# Patient Record
Sex: FEMALE | Race: BLACK OR AFRICAN AMERICAN | Employment: OTHER | URBAN - METROPOLITAN AREA
[De-identification: names, ages, dates, MRNs, and addresses within clinical notes are randomized per-mention and may not be internally consistent; named-entity substitution may affect disease eponyms.]

---

## 2017-12-09 PROBLEM — J96.01 ACUTE RESPIRATORY FAILURE WITH HYPOXIA (HCC): Status: ACTIVE | Noted: 2017-12-09

## 2017-12-09 PROBLEM — J96.00 ACUTE RESPIRATORY FAILURE (HCC): Status: ACTIVE | Noted: 2017-12-09

## 2017-12-10 PROBLEM — R07.9 CHEST PAIN: Status: ACTIVE | Noted: 2017-12-10

## 2017-12-10 PROBLEM — J44.1 COPD WITH ACUTE EXACERBATION (HCC): Status: ACTIVE | Noted: 2017-12-10

## 2019-04-12 ENCOUNTER — HOSPITAL ENCOUNTER (OUTPATIENT)
Dept: GENERAL RADIOLOGY | Age: 59
Discharge: HOME | End: 2019-04-14
Payer: MEDICARE

## 2019-04-12 DIAGNOSIS — Z12.31 VISIT FOR SCREENING MAMMOGRAM: ICD-10-CM

## 2019-04-12 DIAGNOSIS — Z13.820 SCREENING FOR OSTEOPOROSIS: ICD-10-CM

## 2019-04-12 PROCEDURE — 77063 BREAST TOMOSYNTHESIS BI: CPT

## 2019-04-12 PROCEDURE — 77080 DXA BONE DENSITY AXIAL: CPT

## 2022-05-03 ENCOUNTER — OFFICE VISIT (OUTPATIENT)
Dept: ORTHOPEDIC SURGERY | Age: 62
End: 2022-05-03
Payer: COMMERCIAL

## 2022-05-03 VITALS — BODY MASS INDEX: 32.32 KG/M2 | HEIGHT: 65 IN | WEIGHT: 194 LBS | TEMPERATURE: 98 F

## 2022-05-03 DIAGNOSIS — S82.832A OTHER CLOSED FRACTURE OF DISTAL END OF LEFT FIBULA, INITIAL ENCOUNTER: Primary | ICD-10-CM

## 2022-05-03 PROCEDURE — 99203 OFFICE O/P NEW LOW 30 MIN: CPT | Performed by: ORTHOPAEDIC SURGERY

## 2022-05-03 PROCEDURE — 27786 TREATMENT OF ANKLE FRACTURE: CPT | Performed by: ORTHOPAEDIC SURGERY

## 2022-05-03 RX ORDER — HYDROCODONE BITARTRATE AND ACETAMINOPHEN 5; 325 MG/1; MG/1
1 TABLET ORAL EVERY 6 HOURS PRN
Qty: 28 TABLET | Refills: 0 | Status: SHIPPED
Start: 2022-05-03 | End: 2022-05-11 | Stop reason: SDUPTHER

## 2022-05-03 NOTE — PROGRESS NOTES
Albert Henriquez is a 62y.o. year old female who presents today for evaluation of a left ankle injury which occurred on 4/30/22. The patient reports that this injury occurred when she slipped and fell. The patient denies any other injuries. Movement makes the pain worse, the splint and resting makes the pain better. The patient's past medical history, medications, and review of systems was reviewed. On Physical Exam, Albert Henriquez is well-developed, well-nourished, and oriented to person, place and time. her gait is intact. On evaluation of her left lower extremity, there is not obvious deformity. There is swelling and is ecchymosis. she is tender to palpation over the lateral distal fibula region, and otherwise nontender over the remainder of the extremity. Range of motion is decreased secondary to pain over the left ankl. The skin overlying the left ankle is intact without evidence of lesion, laceration or abrasion. Distal pulses are 2+ and symmetric bilaterally. Sensation is grossly intact to light touch and symmetric bilaterally. Xrays:  Distal fibula fracture    Impression:    Diagnosis Orders   1.  Other closed fracture of distal end of left fibula, initial encounter  MD CLOSED TX DIST FIBULA FX    HYDROcodone-acetaminophen (NORCO) 5-325 MG per tablet         Plan:   Conservative treatment  nwb  Boot  Fu in 1 week with xr

## 2022-05-09 DIAGNOSIS — S82.832A OTHER CLOSED FRACTURE OF DISTAL END OF LEFT FIBULA, INITIAL ENCOUNTER: Primary | ICD-10-CM

## 2022-05-11 ENCOUNTER — OFFICE VISIT (OUTPATIENT)
Dept: ORTHOPEDIC SURGERY | Age: 62
End: 2022-05-11
Payer: COMMERCIAL

## 2022-05-11 VITALS — HEIGHT: 65 IN | BODY MASS INDEX: 32.32 KG/M2 | TEMPERATURE: 97.6 F | WEIGHT: 194 LBS

## 2022-05-11 DIAGNOSIS — M17.12 PRIMARY OSTEOARTHRITIS OF LEFT KNEE: ICD-10-CM

## 2022-05-11 DIAGNOSIS — S82.832A OTHER CLOSED FRACTURE OF DISTAL END OF LEFT FIBULA, INITIAL ENCOUNTER: Primary | ICD-10-CM

## 2022-05-11 PROCEDURE — 20610 DRAIN/INJ JOINT/BURSA W/O US: CPT | Performed by: NURSE PRACTITIONER

## 2022-05-11 PROCEDURE — 99213 OFFICE O/P EST LOW 20 MIN: CPT | Performed by: NURSE PRACTITIONER

## 2022-05-11 RX ORDER — TRIAMCINOLONE ACETONIDE 40 MG/ML
40 INJECTION, SUSPENSION INTRA-ARTICULAR; INTRAMUSCULAR ONCE
Status: COMPLETED | OUTPATIENT
Start: 2022-05-11 | End: 2022-05-11

## 2022-05-11 RX ORDER — HYDROCODONE BITARTRATE AND ACETAMINOPHEN 5; 325 MG/1; MG/1
1 TABLET ORAL EVERY 6 HOURS PRN
Qty: 28 TABLET | Refills: 0 | Status: SHIPPED | OUTPATIENT
Start: 2022-05-11 | End: 2022-05-18

## 2022-05-11 RX ADMIN — TRIAMCINOLONE ACETONIDE 40 MG: 40 INJECTION, SUSPENSION INTRA-ARTICULAR; INTRAMUSCULAR at 15:21

## 2022-05-11 NOTE — PROGRESS NOTES
Henrry Momin is a 62y.o. year old female who presents today for evaluation of a left ankle injury which occurred on 4/30/22. The patient reports that this injury occurred when she slipped and fell. She has also been having left knee  pain since the injury as well. The patient's past medical history, medications, and review of systems was reviewed. Past Medical History:   Diagnosis Date    Apnea     Arthritis     Asthma     COPD (chronic obstructive pulmonary disease) ([facility] [address]     History of cardiovascular stress test 12/13/2017    lexiscan stress test    Hypertension     Pneumonia 3/29/2014     Past Surgical History:   Procedure Laterality Date    BUNIONECTOMY      both feet    HERNIA REPAIR      TUBAL LIGATION      URETER REVISION       Current Outpatient Medications   Medication Sig Dispense Refill    mometasone-formoterol (DULERA) 200-5 MCG/ACT inhaler Inhale 2 puffs into the lungs 2 times daily 1 Inhaler 0    lisinopril (PRINIVIL;ZESTRIL) 20 MG tablet Take 1 tablet by mouth daily 30 tablet 3    budesonide-formoterol (SYMBICORT) 160-4.5 MCG/ACT AERO Inhale 2 puffs into the lungs 2 times daily      albuterol (PROVENTIL) (2.5 MG/3ML) 0.083% nebulizer solution Take 2.5 mg by nebulization every 6 hours as needed for Wheezing      Multiple Vitamins-Minerals (THERAPEUTIC MULTIVITAMIN-MINERALS) tablet Take 1 tablet by mouth daily      Omega-3 Fatty Acids (FISH OIL) 500 MG CAPS Take 1 capsule by mouth daily      verapamil (VERELAN PM) 240 MG CR capsule Take 240 mg by mouth 2 times daily       amitriptyline (ELAVIL) 50 MG tablet Take 50 mg by mouth nightly as needed       potassium chloride SA (K-DUR;KLOR-CON M) 10 MEQ tablet Take 10 mEq by mouth 2 times daily.  furosemide (LASIX) 40 MG tablet Take 40 mg by mouth 2 times daily.  ibuprofen (ADVIL;MOTRIN) 600 MG tablet Take 600 mg by mouth every 4 hours as needed for Pain.       oxyCODONE-acetaminophen (PERCOCET)  MG per tablet Take 1 tablet by mouth every 4 hours as needed for Pain (for break through pain).  loratadine (CLARITIN) 10 MG tablet Take 10 mg by mouth daily.  montelukast (SINGULAIR) 10 MG tablet Take 10 mg by mouth nightly.  theophylline (FRANSISCA-24) 200 MG SR capsule Take 200 mg by mouth daily.  esomeprazole Magnesium (NEXIUM) 20 MG PACK Take 40 mg by mouth 2 times daily       senna (SENOKOT) 8.6 MG tablet Take 1 tablet by mouth 3 times daily as needed for Constipation.  DULoxetine (CYMBALTA) 60 MG capsule Take 60 mg by mouth 2 times daily. No current facility-administered medications for this visit. On Physical Exam, La Nena Padilla is well-developed, well-nourished, and oriented to person, place and time. her gait is intact. On evaluation of her left lower extremity, there is not obvious deformity. There is swelling and is ecchymosis. she is tender to palpation over the lateral distal fibula region, and otherwise nontender over the remainder of the extremity. Range of motion is decreased secondary to pain over the left ankl. The skin overlying the left ankle is intact without evidence of lesion, laceration or abrasion. Distal pulses are 2+ and symmetric bilaterally. Sensation is grossly intact to light touch and symmetric bilaterally. Hip exam:   Upon inspection, there is not deformity noted. Upon palpation there is not tenderness. ROM: is  full and symmetrical.   Strength: Hip Flexors 5/5; Hip Abductors 5/5; Hip Adduction 5/5. Knee exam:  Left knee exam shows;  range of motion of R. Knee is 0 to 120, and L. Knee is 0 to 90. The patient does have  pain on motion, effusion is mild, there is tenderness over the  medial, anterior region, there are not any masses, there is not ligamentous instability, there is not  deformity noted. Knee exam: left positive for moderate crepitations, some mild tenderness laxity is not noted with  stress.   There is not a popliteal cyst.    R. Knee:  Lachman's negative, Anterior Drawer negative, Posterior Drawer negative  Camilla's negative, Thallasy  negative,   PF grind test negative, Apprehension test negative, Patellar J sign  negative  L. Knee:  Lachman's negative, Anterior Drawer negative, Posterior Drawer negative  Camilla's negative, Thallasy  negative,   PF grind test positive, Apprehension test negative,  Patellar J sign  negative      Xrays:  Distal fibula fracture with stable alignment, moderate to severe medial joint narrowing with osteophytes and subchondral sclerosis     Impression:    Diagnosis Orders   1. Other closed fracture of distal end of left fibula, initial encounter  20610 - LA DRAIN/INJECT LARGE JOINT/BURSA   2. Primary osteoarthritis of left knee           Plan:   Conservative treatment  nwb  Boot  I had a lengthy discussion with the patient regarding their diagnosis. I explained treatment options including surgical vs non surgical treatment. I reviewed in detail the risks and benefits and outlined the procedure in detail with expected outcomes and possible complications. I also discussed non surgical treatment such as injections (CSI and visco supplementation), physical therapy, topical creams and NSAID's. They have elected for conservative management at this time. I will proceed with a cortisone injection in the Left knee. Verbal and written consent was obtained for the injections. The skin was prepped with alcohol. 1mL of Kenalog 40mg and 9mL of 0.25% Marcaine was  injected to Left knee. The injection was given through the lateral side of the knee. The patient tolerated the injection well.  I will see the patient back in 4 weeks  oarrs run  norco refilled    xr left ankle only in 4 weeks Patient

## 2022-06-08 DIAGNOSIS — S82.832A OTHER CLOSED FRACTURE OF DISTAL END OF LEFT FIBULA, INITIAL ENCOUNTER: Primary | ICD-10-CM

## 2022-06-09 ENCOUNTER — OFFICE VISIT (OUTPATIENT)
Dept: ORTHOPEDIC SURGERY | Age: 62
End: 2022-06-09

## 2022-06-09 VITALS — BODY MASS INDEX: 32.32 KG/M2 | TEMPERATURE: 98 F | WEIGHT: 194 LBS | HEIGHT: 65 IN

## 2022-06-09 DIAGNOSIS — S82.832A OTHER CLOSED FRACTURE OF DISTAL END OF LEFT FIBULA, INITIAL ENCOUNTER: Primary | ICD-10-CM

## 2022-06-09 PROCEDURE — 99024 POSTOP FOLLOW-UP VISIT: CPT | Performed by: NURSE PRACTITIONER

## 2022-06-09 NOTE — PROGRESS NOTES
Reinaldo Gonzalez is a 62y.o. year old female who presents today for evaluation of a left ankle injury which occurred on 4/30/22. The patient reports that this injury occurred when she slipped and fell. She has also been having left knee  pain since the injury as well. The patient's past medical history, medications, and review of systems was reviewed. Past Medical History:   Diagnosis Date    Apnea     Arthritis     Asthma     COPD (chronic obstructive pulmonary disease) ([facility] [address]     History of cardiovascular stress test 12/13/2017    lexiscan stress test    Hypertension     Pneumonia 3/29/2014     Past Surgical History:   Procedure Laterality Date    BUNIONECTOMY      both feet    HERNIA REPAIR      TUBAL LIGATION      URETER REVISION       Current Outpatient Medications   Medication Sig Dispense Refill    mometasone-formoterol (DULERA) 200-5 MCG/ACT inhaler Inhale 2 puffs into the lungs 2 times daily 1 Inhaler 0    lisinopril (PRINIVIL;ZESTRIL) 20 MG tablet Take 1 tablet by mouth daily 30 tablet 3    budesonide-formoterol (SYMBICORT) 160-4.5 MCG/ACT AERO Inhale 2 puffs into the lungs 2 times daily      albuterol (PROVENTIL) (2.5 MG/3ML) 0.083% nebulizer solution Take 2.5 mg by nebulization every 6 hours as needed for Wheezing      Multiple Vitamins-Minerals (THERAPEUTIC MULTIVITAMIN-MINERALS) tablet Take 1 tablet by mouth daily      Omega-3 Fatty Acids (FISH OIL) 500 MG CAPS Take 1 capsule by mouth daily      verapamil (VERELAN PM) 240 MG CR capsule Take 240 mg by mouth 2 times daily       amitriptyline (ELAVIL) 50 MG tablet Take 50 mg by mouth nightly as needed       potassium chloride SA (K-DUR;KLOR-CON M) 10 MEQ tablet Take 10 mEq by mouth 2 times daily.  furosemide (LASIX) 40 MG tablet Take 40 mg by mouth 2 times daily.  ibuprofen (ADVIL;MOTRIN) 600 MG tablet Take 600 mg by mouth every 4 hours as needed for Pain.       oxyCODONE-acetaminophen (PERCOCET)  MG per tablet Take 1 tablet by mouth every 4 hours as needed for Pain (for break through pain).  loratadine (CLARITIN) 10 MG tablet Take 10 mg by mouth daily.  montelukast (SINGULAIR) 10 MG tablet Take 10 mg by mouth nightly.  theophylline (FRANSISCA-24) 200 MG SR capsule Take 200 mg by mouth daily.  esomeprazole Magnesium (NEXIUM) 20 MG PACK Take 40 mg by mouth 2 times daily       senna (SENOKOT) 8.6 MG tablet Take 1 tablet by mouth 3 times daily as needed for Constipation.  DULoxetine (CYMBALTA) 60 MG capsule Take 60 mg by mouth 2 times daily. No current facility-administered medications for this visit. On Physical Exam, Bishop Winn is well-developed, well-nourished, and oriented to person, place and time. her gait is intact. On evaluation of her left lower extremity, there is not obvious deformity. There is swelling and is ecchymosis. she is tender to palpation over the lateral distal fibula region, and otherwise nontender over the remainder of the extremity. Range of motion is decreased secondary to pain over the left ankl. The skin overlying the left ankle is intact without evidence of lesion, laceration or abrasion. Distal pulses are 2+ and symmetric bilaterally. Sensation is grossly intact to light touch and symmetric bilaterally. Hip exam:   Upon inspection, there is not deformity noted. Upon palpation there is not tenderness. ROM: is  full and symmetrical.   Strength: Hip Flexors 5/5; Hip Abductors 5/5; Hip Adduction 5/5. Knee exam:  Left knee exam shows;  range of motion of R. Knee is 0 to 120, and L. Knee is 0 to 90. The patient does have  pain on motion, effusion is mild, there is tenderness over the  medial, anterior region, there are not any masses, there is not ligamentous instability, there is not  deformity noted. Knee exam: left positive for moderate crepitations, some mild tenderness laxity is not noted with  stress.   There is not a popliteal cyst.    R. Knee:  Lachman's negative, Anterior Drawer negative, Posterior Drawer negative  Camilla's negative, Thallasy  negative,   PF grind test negative, Apprehension test negative, Patellar J sign  negative  L. Knee:  Lachman's negative, Anterior Drawer negative, Posterior Drawer negative  Camilla's negative, Thallasy  negative,   PF grind test positive, Apprehension test negative,  Patellar J sign  negative      Xrays:  Distal fibula fracture with stable alignment, moderate to severe medial joint narrowing with osteophytes and subchondral sclerosis     Impression:    Diagnosis Orders   1.  Other closed fracture of distal end of left fibula, initial encounter           Plan:   Start 50% weight bearing, increase to 100% in boot, then dc boot  aso   Hep  Fu in 2 months with xr

## 2022-08-08 DIAGNOSIS — S82.832A OTHER CLOSED FRACTURE OF DISTAL END OF LEFT FIBULA, INITIAL ENCOUNTER: Primary | ICD-10-CM

## 2022-08-09 ENCOUNTER — OFFICE VISIT (OUTPATIENT)
Dept: SLEEP CENTER | Age: 62
End: 2022-08-09
Payer: COMMERCIAL

## 2022-08-09 VITALS
DIASTOLIC BLOOD PRESSURE: 98 MMHG | BODY MASS INDEX: 32.97 KG/M2 | RESPIRATION RATE: 16 BRPM | HEART RATE: 85 BPM | OXYGEN SATURATION: 97 % | SYSTOLIC BLOOD PRESSURE: 166 MMHG | WEIGHT: 193.1 LBS | HEIGHT: 64 IN

## 2022-08-09 DIAGNOSIS — J44.9 CHRONIC OBSTRUCTIVE PULMONARY DISEASE, UNSPECIFIED COPD TYPE (HCC): ICD-10-CM

## 2022-08-09 DIAGNOSIS — G47.33 OSA (OBSTRUCTIVE SLEEP APNEA): Primary | ICD-10-CM

## 2022-08-09 PROCEDURE — 99204 OFFICE O/P NEW MOD 45 MIN: CPT | Performed by: INTERNAL MEDICINE

## 2022-08-09 RX ORDER — CLONIDINE HYDROCHLORIDE 0.1 MG/1
0.1 TABLET ORAL 3 TIMES DAILY
COMMUNITY

## 2022-08-09 ASSESSMENT — SLEEP AND FATIGUE QUESTIONNAIRES
HOW LIKELY ARE YOU TO NOD OFF OR FALL ASLEEP WHILE SITTING QUIETLY AFTER LUNCH WITHOUT ALCOHOL: 3
HOW LIKELY ARE YOU TO NOD OFF OR FALL ASLEEP WHILE WATCHING TV: 3
HOW LIKELY ARE YOU TO NOD OFF OR FALL ASLEEP WHILE SITTING AND TALKING TO SOMEONE: 0
HOW LIKELY ARE YOU TO NOD OFF OR FALL ASLEEP WHILE SITTING INACTIVE IN A PUBLIC PLACE: 0
HOW LIKELY ARE YOU TO NOD OFF OR FALL ASLEEP WHEN YOU ARE A PASSENGER IN A CAR FOR AN HOUR WITHOUT A BREAK: 3
HOW LIKELY ARE YOU TO NOD OFF OR FALL ASLEEP IN A CAR, WHILE STOPPED FOR A FEW MINUTES IN TRAFFIC: 0
HOW LIKELY ARE YOU TO NOD OFF OR FALL ASLEEP WHILE LYING DOWN TO REST IN THE AFTERNOON WHEN CIRCUMSTANCES PERMIT: 3
HOW LIKELY ARE YOU TO NOD OFF OR FALL ASLEEP WHILE SITTING AND READING: 2
ESS TOTAL SCORE: 14

## 2022-08-09 NOTE — PROGRESS NOTES
[facility] Sleep Medicine    Patient Name: Azam Gibbs  Age: 62 y.o.   : 1960    Date of Visit: 22        HPI   Azam Gibbs is a 62 y.o. female with  has a past medical history of Apnea, Arthritis, Asthma, COPD (chronic obstructive pulmonary disease) ([facility] [address] History of cardiovascular stress test (2017), Hypertension, and Pneumonia (3/29/2014). who presents as a new patient to Sleep Clinic, referred by Dr. Johnson ref. provider found, for Sleep Apnea      Dx with TOMMY >10 years ago. No records available of original sleep study. PSG CPAP titration from  in chart. Was on CPAP but she has been having a hard time acclimating to the CPAP, very uncomfortable and it wakes up often due to discomfort with the mask. Side sleeper, trouble with the mask pushing into pillow. She also notes she is snoring and gasping during the night. Current CPAP was given to her in . She continues to receive replacement supplies. Sleep Medicine 2022   Sitting and reading 2   Watching TV 3   Sitting, inactive in a public place (e.g. a theatre or a meeting) 0   As a passenger in a car for an hour without a break 3   Lying down to rest in the afternoon when circumstances permit 3   Sitting and talking to someone 0   Sitting quietly after a lunch without alcohol 3   In a car, while stopped for a few minutes in traffic 0   Total score 14   Neck circumference (Inches) 15        Patient goes to bed at 11pm, falls asleep 12:30-1am and wakes up at variable times depending on how she slept. Takes hours to to fall asleep. Pt does not sleep through the night with 3 arousals for CPAP discomfort and uses bathroom, sometimes up 4-5 times at night to use restroom. Pt does take sleep aids - takes 10  mg melatonin but not very effective. Sometimes will also take Tylenol PM. Previously on Ambien few years when her  passed away which worked for her. Was taking it for a few years and did not experience diminishing effect. Estimated total sleep time is 4.5 hours. Does drink caffeine - 1 c coffee 4 times per week. Does not have morning headaches. Does have dry mouth in the mornings. Does  have daytime sleepiness. Does have heartburn. She is snoring. Pt does take naps during the day, 2-3 pm takes a 1-1.5 hour nap. Pt does not report sleepiness while driving or accidents due to sleepiness. Pt does not work. No RLS symptoms. Interested in [facility] based on TV commercials. PMH:  Past Medical History:   Diagnosis Date    Apnea     Arthritis     Asthma     COPD (chronic obstructive pulmonary disease) ([facility] [address]     History of cardiovascular stress test 12/13/2017    lexiscan stress test    Hypertension     Pneumonia 3/29/2014        PSH:  Past Surgical History:   Procedure Laterality Date    BUNIONECTOMY      both feet    HERNIA REPAIR      TUBAL LIGATION      URETER REVISION            Soc Hx:  Social History     Tobacco Use    Smoking status: Every Day     Packs/day: 0.25     Years: 15.00     Pack years: 3.75     Types: Cigarettes     Last attempt to quit: 5/27/2007     Years since quitting: 15.2    Smokeless tobacco: Never   Substance Use Topics    Alcohol use:  Yes     Alcohol/week: 1.0 standard drink     Types: 1 Glasses of wine per week     Comment: at bedtime only    Drug use: No        Fam Hx:  Family History   Problem Relation Age of Onset    High Blood Pressure Mother     Diabetes Mother     Sickle Cell Trait Mother     Cancer Mother     Other Mother     Early Death Father     Heart Disease Father     Cancer Maternal Aunt     Cancer Maternal Uncle     Diabetes Maternal Grandfather     Cancer Maternal Cousin         Current Outpatient Medications   Medication Sig Dispense Refill    tiotropium (SPIRIVA RESPIMAT) 2.5 MCG/ACT AERS inhaler Inhale 2 puffs into the lungs      cloNIDine (CATAPRES) 0.1 MG tablet Take 0.1 mg by mouth in the morning, at noon, and at bedtime      albuterol (PROVENTIL) (2.5 MG/3ML) 0.083% nebulizer solution Take 2.5 mg by nebulization every 6 hours as needed for Wheezing      Multiple Vitamins-Minerals (THERAPEUTIC MULTIVITAMIN-MINERALS) tablet Take 1 tablet by mouth daily      Omega-3 Fatty Acids (FISH OIL) 500 MG CAPS Take 1 capsule by mouth daily      verapamil (VERELAN PM) 240 MG CR capsule Take 240 mg by mouth 2 times daily       potassium chloride SA (K-DUR;KLOR-CON M) 10 MEQ tablet Take 10 mEq by mouth 2 times daily. furosemide (LASIX) 40 MG tablet Take 40 mg by mouth 2 times daily. ibuprofen (ADVIL;MOTRIN) 600 MG tablet Take 600 mg by mouth every 4 hours as needed for Pain.      loratadine (CLARITIN) 10 MG tablet Take 10 mg by mouth daily. montelukast (SINGULAIR) 10 MG tablet Take 10 mg by mouth nightly. theophylline (FRANSISCA-24) 200 MG SR capsule Take 200 mg by mouth daily. esomeprazole Magnesium (NEXIUM) 20 MG PACK Take 40 mg by mouth 2 times daily       senna (SENOKOT) 8.6 MG tablet Take 1 tablet by mouth 3 times daily as needed for Constipation. DULoxetine (CYMBALTA) 60 MG capsule Take 60 mg by mouth 2 times daily. mometasone-formoterol (DULERA) 200-5 MCG/ACT inhaler Inhale 2 puffs into the lungs 2 times daily (Patient not taking: Reported on 8/9/2022) 1 Inhaler 0    lisinopril (PRINIVIL;ZESTRIL) 20 MG tablet Take 1 tablet by mouth daily (Patient not taking: Reported on 8/9/2022) 30 tablet 3    budesonide-formoterol (SYMBICORT) 160-4.5 MCG/ACT AERO Inhale 2 puffs into the lungs 2 times daily (Patient not taking: Reported on 8/9/2022)      amitriptyline (ELAVIL) 50 MG tablet Take 50 mg by mouth nightly as needed  (Patient not taking: Reported on 8/9/2022)      oxyCODONE-acetaminophen (PERCOCET)  MG per tablet Take 1 tablet by mouth every 4 hours as needed for Pain (for break through pain). (Patient not taking: Reported on 8/9/2022)       No current facility-administered medications for this visit.         Review of Systems  (Sleep ROS above)  Review of Systems Objective:   Physical Exam  BP (!) 166/98 (Site: Left Upper Arm, Position: Sitting, Cuff Size: Large Adult)   Pulse 85   Resp 16   Ht 5' 4\" (1.626 m)   Wt 193 lb 1.6 oz (87.6 kg)   LMP 03/25/2014   SpO2 97%   BMI 33.15 kg/m²        Physical Exam        Sleep Medicine 8/9/2022   Sitting and reading 2   Watching TV 3   Sitting, inactive in a public place (e.g. a theatre or a meeting) 0   As a passenger in a car for an hour without a break 3   Lying down to rest in the afternoon when circumstances permit 3   Sitting and talking to someone 0   Sitting quietly after a lunch without alcohol 3   In a car, while stopped for a few minutes in traffic 0   Total score 14   Neck circumference (Inches) 15         SLEEP STUDY HISTORY      No specialty comments available. PERTINENT LAB RESULTS  No results found for: FERRITIN       Assessment:      Micheline was seen today for sleep apnea. Diagnoses and all orders for this visit:    TOMMY (obstructive sleep apnea)  -     Baseline Diagnostic Sleep Study; Future    [facility], [facility]  -     Baseline Diagnostic Sleep Study; Future    BMI 33.0-33.9,adult  -     Ambulatory Referral to Bariatric Surgery     Plan:      TOMMY - uncertain severity, no records available of her baseline study. She has gained weight since her previous study, and also having snoring and respiratory symptoms therefore her current PAP settings may not be adequate. Also with the history of COPD she may need ventilation with a bilevel PAP, to be determined during her PAP titration. 2.  COPD - no prior PFTs, not regularly following with pulmonology but on Symbicort and no flares/hospitalizations recently. 3. Insomnia - will evaluate for appropriate CPAP settings as she previously slept well on PAP therapy but now having difficulty with her sleep. 4. Obesity - she is interested in seeing a medical weight loss specialist. Referral placed. Patient will follow up Return in about 3 months (around 11/9/2022).     Leidy Calle,   Sleep Medicine   [facility] Phone -[phone], option 2  Fax- [phone]

## 2022-08-12 ENCOUNTER — OFFICE VISIT (OUTPATIENT)
Dept: ORTHOPEDIC SURGERY | Age: 62
End: 2022-08-12
Payer: COMMERCIAL

## 2022-08-12 VITALS — WEIGHT: 193 LBS | BODY MASS INDEX: 32.95 KG/M2 | HEIGHT: 64 IN

## 2022-08-12 DIAGNOSIS — M17.12 PRIMARY OSTEOARTHRITIS OF LEFT KNEE: ICD-10-CM

## 2022-08-12 DIAGNOSIS — S82.832A OTHER CLOSED FRACTURE OF DISTAL END OF LEFT FIBULA, INITIAL ENCOUNTER: Primary | ICD-10-CM

## 2022-08-12 PROCEDURE — 99213 OFFICE O/P EST LOW 20 MIN: CPT | Performed by: NURSE PRACTITIONER

## 2022-08-12 PROCEDURE — 20610 DRAIN/INJ JOINT/BURSA W/O US: CPT | Performed by: NURSE PRACTITIONER

## 2022-08-12 RX ORDER — GABAPENTIN 300 MG/1
CAPSULE ORAL
COMMUNITY
Start: 2022-08-08

## 2022-08-12 RX ORDER — TRIAMCINOLONE ACETONIDE 40 MG/ML
40 INJECTION, SUSPENSION INTRA-ARTICULAR; INTRAMUSCULAR ONCE
Status: COMPLETED | OUTPATIENT
Start: 2022-08-12 | End: 2022-08-12

## 2022-08-12 RX ADMIN — TRIAMCINOLONE ACETONIDE 40 MG: 40 INJECTION, SUSPENSION INTRA-ARTICULAR; INTRAMUSCULAR at 11:18

## 2022-08-12 NOTE — PROGRESS NOTES
Denise Storm is a 62y.o. year old female who presents today for evaluation of a left ankle injury which occurred on 4/30/22. The patient reports that this injury occurred when she slipped and fell. She has also been having left knee  pain since the injury as well. The patient's past medical history, medications, and review of systems was reviewed. Past Medical History:   Diagnosis Date    Apnea     Arthritis     Asthma     COPD (chronic obstructive pulmonary disease) ([facility] [address]     History of cardiovascular stress test 12/13/2017    lexiscan stress test    Hypertension     Pneumonia 3/29/2014     Past Surgical History:   Procedure Laterality Date    BUNIONECTOMY      both feet    HERNIA REPAIR      TUBAL LIGATION      URETER REVISION       Current Outpatient Medications   Medication Sig Dispense Refill    gabapentin (NEURONTIN) 300 MG capsule take 1 capsule by mouth twice a day      tiotropium (SPIRIVA RESPIMAT) 2.5 MCG/ACT AERS inhaler Inhale 2 puffs into the lungs      cloNIDine (CATAPRES) 0.1 MG tablet Take 0.1 mg by mouth in the morning, at noon, and at bedtime      mometasone-formoterol (DULERA) 200-5 MCG/ACT inhaler Inhale 2 puffs into the lungs 2 times daily 1 Inhaler 0    lisinopril (PRINIVIL;ZESTRIL) 20 MG tablet Take 1 tablet by mouth daily 30 tablet 3    budesonide-formoterol (SYMBICORT) 160-4.5 MCG/ACT AERO Inhale 2 puffs into the lungs in the morning and 2 puffs before bedtime.       albuterol (PROVENTIL) (2.5 MG/3ML) 0.083% nebulizer solution Take 2.5 mg by nebulization every 6 hours as needed for Wheezing      Multiple Vitamins-Minerals (THERAPEUTIC MULTIVITAMIN-MINERALS) tablet Take 1 tablet by mouth daily      Omega-3 Fatty Acids (FISH OIL) 500 MG CAPS Take 1 capsule by mouth daily      verapamil (VERELAN PM) 240 MG CR capsule Take 240 mg by mouth 2 times daily       amitriptyline (ELAVIL) 50 MG tablet Take 50 mg by mouth nightly as needed      potassium chloride SA (K-DUR;KLOR-CON M) 10 MEQ tablet Take 10 mEq by mouth 2 times daily. furosemide (LASIX) 40 MG tablet Take 40 mg by mouth 2 times daily. ibuprofen (ADVIL;MOTRIN) 600 MG tablet Take 600 mg by mouth every 4 hours as needed for Pain. oxyCODONE-acetaminophen (PERCOCET)  MG per tablet Take 1 tablet by mouth every 4 hours as needed for Pain (for break through pain). loratadine (CLARITIN) 10 MG tablet Take 10 mg by mouth daily. montelukast (SINGULAIR) 10 MG tablet Take 10 mg by mouth nightly. theophylline (FRANSISCA-24) 200 MG SR capsule Take 200 mg by mouth daily. esomeprazole Magnesium (NEXIUM) 20 MG PACK Take 40 mg by mouth 2 times daily       senna (SENOKOT) 8.6 MG tablet Take 1 tablet by mouth 3 times daily as needed for Constipation. DULoxetine (CYMBALTA) 60 MG capsule Take 60 mg by mouth 2 times daily. No current facility-administered medications for this visit. On Physical Exam, Courtney Jo is well-developed, well-nourished, and oriented to person, place and time. her gait is intact. On evaluation of her left lower extremity, there is not obvious deformity. There is swelling and is ecchymosis. she is tender to palpation over the lateral distal fibula region, and otherwise nontender over the remainder of the extremity. Range of motion is decreased secondary to pain over the left ankl. The skin overlying the left ankle is intact without evidence of lesion, laceration or abrasion. Distal pulses are 2+ and symmetric bilaterally. Sensation is grossly intact to light touch and symmetric bilaterally. Hip exam:   Upon inspection, there is not deformity noted. Upon palpation there is not tenderness. ROM: is  full and symmetrical.   Strength: Hip Flexors 5/5; Hip Abductors 5/5; Hip Adduction 5/5. Knee exam:  Left knee exam shows;  range of motion of R. Knee is 0 to 120, and L. Knee is 0 to 90.  The patient does have  pain on motion, effusion is mild, there is tenderness over the  medial, anterior region, there are not any masses, there is not ligamentous instability, there is not  deformity noted. Knee exam: left positive for moderate crepitations, some mild tenderness laxity is not noted with  stress. There is not a popliteal cyst.    R. Knee:  Lachman's negative, Anterior Drawer negative, Posterior Drawer negative  Camilla's negative, Thallasy  negative,   PF grind test negative, Apprehension test negative, Patellar J sign  negative  L. Knee:  Lachman's negative, Anterior Drawer negative, Posterior Drawer negative  Camilla's negative, Thallasy  negative,   PF grind test positive, Apprehension test negative,  Patellar J sign  negative      Xrays:  Distal fibula fracture with stable alignment, moderate to severe medial joint narrowing with osteophytes and subchondral sclerosis     Impression:    Diagnosis Orders   1. Other closed fracture of distal end of left fibula, initial encounter        2. Primary osteoarthritis of left knee              Plan:   Wbat  Hep  Activity as toelrated   I will proceed with a cortisone injection in the Left knee. Verbal and written consent was obtained for the injections. The skin was prepped with alcohol. 1mL of Kenalog 40mg and 9mL of 0.25% Marcaine was  injected to Left knee. The injection was given through the lateral side of the knee. The patient tolerated the injection well.  I will see the patient back prn

## 2022-09-09 DIAGNOSIS — M25.561 ACUTE PAIN OF RIGHT KNEE: ICD-10-CM

## 2022-09-09 DIAGNOSIS — M25.512 ACUTE PAIN OF LEFT SHOULDER: Primary | ICD-10-CM

## 2022-11-28 ENCOUNTER — TELEPHONE (OUTPATIENT)
Dept: SLEEP CENTER | Age: 62
End: 2022-11-28

## 2022-11-28 NOTE — TELEPHONE ENCOUNTER
Call to pt to reschedule 11-29-22 appt d/t has not been scheduled for her SS. LM with call back #.  Spoke to [facility] they will call her to get it scheduled

## 2022-11-30 ENCOUNTER — TELEPHONE (OUTPATIENT)
Dept: SLEEP CENTER | Age: 62
End: 2022-11-30

## 2022-12-09 ENCOUNTER — OFFICE VISIT (OUTPATIENT)
Dept: BARIATRICS/WEIGHT MGMT | Age: 62
End: 2022-12-09
Payer: COMMERCIAL

## 2022-12-09 VITALS
DIASTOLIC BLOOD PRESSURE: 86 MMHG | SYSTOLIC BLOOD PRESSURE: 144 MMHG | WEIGHT: 201 LBS | HEIGHT: 63 IN | TEMPERATURE: 97.2 F | BODY MASS INDEX: 35.61 KG/M2 | HEART RATE: 87 BPM

## 2022-12-09 DIAGNOSIS — E66.01 CLASS 2 SEVERE OBESITY DUE TO EXCESS CALORIES WITH SERIOUS COMORBIDITY AND BODY MASS INDEX (BMI) OF 36.0 TO 36.9 IN ADULT (HCC): ICD-10-CM

## 2022-12-09 DIAGNOSIS — G89.29 CHRONIC MIDLINE LOW BACK PAIN WITH LEFT-SIDED SCIATICA: Primary | ICD-10-CM

## 2022-12-09 DIAGNOSIS — M54.42 CHRONIC MIDLINE LOW BACK PAIN WITH LEFT-SIDED SCIATICA: Primary | ICD-10-CM

## 2022-12-09 PROCEDURE — 3078F DIAST BP <80 MM HG: CPT | Performed by: INTERNAL MEDICINE

## 2022-12-09 PROCEDURE — 3074F SYST BP LT 130 MM HG: CPT | Performed by: INTERNAL MEDICINE

## 2022-12-09 PROCEDURE — 99205 OFFICE O/P NEW HI 60 MIN: CPT | Performed by: INTERNAL MEDICINE

## 2022-12-09 RX ORDER — OMEPRAZOLE 40 MG/1
CAPSULE, DELAYED RELEASE ORAL
COMMUNITY
Start: 2022-10-07

## 2022-12-09 RX ORDER — BUPROPION HYDROCHLORIDE 150 MG/1
TABLET, EXTENDED RELEASE ORAL
COMMUNITY
Start: 2022-10-06

## 2022-12-09 RX ORDER — HYDROXYZINE PAMOATE 50 MG/1
CAPSULE ORAL
COMMUNITY
Start: 2022-10-06

## 2022-12-09 NOTE — PATIENT INSTRUCTIONS
Ask about HCTZ, Losartan for blood pressure (h/o angioedema in brother with Lisinopril). Rules:  Count every calorie every day  Limit sweets to one day per month  Limit chips/crackers/pretzels/nuts/popcorn to 100 luis/day  Eliminate all sugar sweetened beverages (including fruit juice)  Limit restaurants (including fast food and food from a convenience store) to one time every two weeks while in town    Requirements:  Make sure protein intake is at least 65 grams per day (do not count protein every day; instead spot check your intake every 2-3 weeks and make sure what you think you are getting is close to accurate; consider using a protein shake if needed; these are in the pharmacy section of the stores, not the grocery section; Premier, Pure Protein and Fairlife are relatively inexpensive and taste good to most patients; other options are Nectar, Boost Max, Ensure Max, BeneProtein and GNC lean (which is lactose-free); Nectar fruit, Premier Protein Clear, IsoPure Protein Drink, and Protein 2 O are water-based options; Quest (or Cosco, which is cheaper and is ordered on [employer]) and the [employer] protein bars can also be used, but have less protein in them ) (<200 Luis, >25 g protein) - (chicken, fish, turkey, egg white)  (Disclaimer: Dietary supplements rarely have their listed ingredients and the amount of each verified by a third party other. Sometimes they give verification for their claims to be GMO and gluten free and to be organic. However, even such verifications as these may still be untrustworthy.)  Make sure that fiber intake is at least 22 grams per day. Do this by either eating 12 tablespoons of the original, plain Fiber One cereal every day or 4 tablespoons of wheat dextrin powder (Benefiber or a generic brand) every day.  Work up to this amount slowly by starting with only one-eighth to one-fourth of the target amount and then adding another one-eighth to one-fourth every one or two weeks until reaching the target. Take one multivitamin every day    Targets:  Limit calorie intake to 1200 calories/day  Walk 30 minutes daily  Avoid eating 2 hours within bedtime. Tips:  Do not eat outside of the dining room or the kitchen  Do not eat while watching TV, videos, working on the computer or using a smart phone  Do not eat food out of a multi-serving bag or container. Follow up in 2 months.

## 2022-12-09 NOTE — PROGRESS NOTES
CC -   Back pain, HTN, Obesity    Would like to lose 30 lbs    BACKGROUND -   First visit: 12/9/22    Obesity (all weight in lbs)  Began in the last 2 yrs  Initial Ht 62.5, Wt 201.0,  BMI 36.18  HS Grad wt 98   Lowest   wt 98   Highest  wt 234  Pattern of wt gain: gradual  Wt change past yr: +7 lbs  Most wt lost: 70 lbs  Other diets attempted: cooking healthier, swimming, walking    Desire to lose weight: 10/10    Initial Diet:    Number of meals per day - 2    Number of snacks per day - 3-4    Meal volume - 9\" plate,  rarely seconds    Fast food/convenience store - 0x/week    Restaurants (not fast food) - 0x/week   Sweets - 3d/week   Chips - 2d/week   Crackers/pretzels - 0d/week   Nuts - 2d/week   Peanut Butter - 2d/week   Popcorn - 0d/week   Dried fruit - 0-1d/week   Whole fruit - 2d/week   Breakfast cereal - 0d/week   Granola/Protein/Energy bar - 3d/week (granola bars)   Sugar sweetened beverages - gingerale 1 can/night, sunkist ?cherry lemonade - 1x/wk, orange juice ~8 oz daily, coffee ~1 cup with ~ 4 tbsp cream, no energy drink, smt tea   Protein - No supplements   Fiber - No supplements    Wt effect of HR foods = 2100 Luis/wk = 300 Luis/d= 17% DEN = 31 lb/year. Initial Exercise:    [employer]    Walking - ~2 miles/d  - not currently    Running - no    Resistance - no    Aerobic class - no     Sleep - not good, 11 pm - 4:30 am, no daytime naps  ______________________    [facility] -  Past Medical History:   Diagnosis Date    Apnea     Arthritis     Asthma     COPD (chronic obstructive pulmonary disease) ([facility] [address]     History of cardiovascular stress test 12/13/2017    lexiscan stress test    Hypertension     Pneumonia 3/29/2014   Arthritis - back pain with left sciatica  Fibromyalgia    Past Surgical History:   Procedure Laterality Date    BUNIONECTOMY      both feet    HERNIA REPAIR      TUBAL LIGATION      URETER REVISION       Prior to Admission medications    Medication Sig Start Date End Date Taking? Authorizing Provider   buPROPion [facility] SR) 150 MG extended release tablet take 1 tablet by mouth twice a day 10/6/22   Historical Provider, MD   hydrOXYzine pamoate (VISTARIL) 50 MG capsule take 1 capsule by mouth four times a day 10/6/22   Historical Provider, MD   omeprazole (PRILOSEC) 40 MG delayed release capsule take 1 capsule by mouth once daily 10/7/22   Historical Provider, MD   gabapentin (NEURONTIN) 300 MG capsule take 1 capsule by mouth twice a day 8/8/22   Historical Provider, MD   tiotropium (SPIRIVA RESPIMAT) 2.5 MCG/ACT AERS inhaler Inhale 2 puffs into the lungs    Historical Provider, MD   cloNIDine (CATAPRES) 0.1 MG tablet Take 0.1 mg by mouth in the morning, at noon, and at bedtime    Historical Provider, MD   albuterol (PROVENTIL) (2.5 MG/3ML) 0.083% nebulizer solution Take 2.5 mg by nebulization every 6 hours as needed for Wheezing    Historical Provider, MD   Multiple Vitamins-Minerals (THERAPEUTIC MULTIVITAMIN-MINERALS) tablet Take 1 tablet by mouth daily    Historical Provider, MD   Omega-3 Fatty Acids (FISH OIL) 500 MG CAPS Take 1 capsule by mouth daily    Historical Provider, MD   verapamil (VERELAN PM) 240 MG CR capsule Take 240 mg by mouth 2 times daily     Historical Provider, MD   potassium chloride SA (K-DUR;KLOR-CON M) 10 MEQ tablet Take 10 mEq by mouth 2 times daily. Historical Provider, MD   furosemide (LASIX) 40 MG tablet Take 40 mg by mouth 2 times daily. Historical Provider, MD   ibuprofen (ADVIL;MOTRIN) 600 MG tablet Take 600 mg by mouth every 4 hours as needed for Pain. Historical Provider, MD   loratadine (CLARITIN) 10 MG tablet Take 10 mg by mouth daily. Historical Provider, MD   montelukast (SINGULAIR) 10 MG tablet Take 10 mg by mouth nightly. Historical Provider, MD   theophylline (FRANSISCA-24) 200 MG SR capsule Take 200 mg by mouth daily. Historical Provider, MD   DULoxetine (CYMBALTA) 60 MG capsule Take 60 mg by mouth 2 times daily.     Historical Provider, MD     Allergies: No Known Allergies    Social history: smoking: about 5 cigarettes/day ; Alcohol: moscato 1 glass every 3 days, work: no. ROS -  Card - no CP  GI - no N/V/D/C    PE -  Gen : BP (!) 144/86 (Site: Left Upper Arm, Position: Sitting, Cuff Size: Large Adult)   Pulse 87   Temp 97.2 °F (36.2 °C) (Temporal)   Ht 5' 2.5\" (1.588 m)   Wt 201 lb (91.2 kg)   LMP 03/25/2014   BMI 36.18 kg/m²    WN, WD, NAD  Lung: Nml resp effort, left lung few wheeze  Heart:  RRR w/o MGR, trace LE pitting edema b/l  Psych: Normal mood   Full affect  Neuro: Moves all ext well  ______________________    HISTORY & ASSESSMENT/PLAN -     Problem 1 - Chronic back pain   HPI -    gradually worse over the years, not related to an injury              Low back midline              Sciatica on left side              Severity this past week: moderate to severe  Assessment - Uncontrolled; excess wt is increasing the mechanical load on the spine and altering the biomechanics of posture; reducing it may therefore improve the pain  Plan -  Taking prn ibuprofen, Wt reduction per the plan below    Problem 2  - Obesity   HPI   - See above Background for description    Weight  Date    201.0  12/9/22    Patient's estimated daily energy need (DEN) = 1795 Luis/d = 33306 Luis/wk    Assessment  - Uncontrolled    Plan   - Talked about various options. Recommended calorie counting with low calorie diet as detailed below. Would like an to start without an appetite suppressant. Also BP on high side, clonidine causes drowsiness, would like to change medication if possible. Planned as follows:  Patient Instructions   Ask about HCTZ, Losartan for blood pressure (h/o angioedema in brother with Lisinopril).     Rules:  Count every calorie every day  Limit sweets to one day per month  Limit chips/crackers/pretzels/nuts/popcorn to 100 luis/day  Eliminate all sugar sweetened beverages (including fruit juice)  Limit restaurants (including fast food and food from a convenience store) to one time every two weeks while in town    Requirements:  Make sure protein intake is at least 65 grams per day (do not count protein every day; instead spot check your intake every 2-3 weeks and make sure what you think you are getting is close to accurate; consider using a protein shake if needed; these are in the pharmacy section of the stores, not the grocery section; Premier, Pure Protein and Fairlife are relatively inexpensive and taste good to most patients; other options are Nectar, Boost Max, Ensure Max, BeneProtein and GNC lean (which is lactose-free); Nectar fruit, Premier Protein Clear, IsoPure Protein Drink, and Protein 2 O are water-based options; Quest (or Cosco, which is cheaper and is ordered on [employer]) and the [employer] protein bars can also be used, but have less protein in them ) (<200 Luis, >25 g protein) - (chicken, fish, turkey, egg white)  (Disclaimer: Dietary supplements rarely have their listed ingredients and the amount of each verified by a third party other. Sometimes they give verification for their claims to be GMO and gluten free and to be organic. However, even such verifications as these may still be untrustworthy.)  Make sure that fiber intake is at least 22 grams per day. Do this by either eating 12 tablespoons of the original, plain Fiber One cereal every day or 4 tablespoons of wheat dextrin powder (Benefiber or a generic brand) every day. Work up to this amount slowly by starting with only one-eighth to one-fourth of the target amount and then adding another one-eighth to one-fourth every one or two weeks until reaching the target. Take one multivitamin every day    Targets:  Limit calorie intake to 1200 calories/day  Walk 30 minutes daily  Avoid eating 2 hours within bedtime.      Tips:  Do not eat outside of the dining room or the kitchen  Do not eat while watching TV, videos, working on the computer or using a smart phone  Do not eat food out of a multi-serving bag or container. Follow up in 2 months. Total time spent on encounter: 62 min. Desean Sherman MD  Internal Medicine/Obesity Medicine  12/9/2022.

## 2023-02-01 ENCOUNTER — HOSPITAL ENCOUNTER (OUTPATIENT)
Dept: SLEEP CENTER | Age: 63
Discharge: HOME | End: 2023-02-01
Payer: MEDICARE

## 2023-02-01 DIAGNOSIS — G47.33 OSA (OBSTRUCTIVE SLEEP APNEA): ICD-10-CM

## 2023-02-01 DIAGNOSIS — J44.9 CHRONIC OBSTRUCTIVE PULMONARY DISEASE, UNSPECIFIED COPD TYPE (HCC): ICD-10-CM

## 2023-02-01 PROCEDURE — 95810 POLYSOM 6/> YRS 4/> PARAM: CPT

## 2023-02-19 DIAGNOSIS — G47.33 OSA (OBSTRUCTIVE SLEEP APNEA): Primary | ICD-10-CM

## 2023-02-20 ENCOUNTER — TELEPHONE (OUTPATIENT)
Dept: SLEEP CENTER | Age: 63
End: 2023-02-20

## 2023-02-20 NOTE — TELEPHONE ENCOUNTER
Call to pt discussed SS results and tx recommendations for pap therapy. Pt agreeable. Also discussed compliance, mask exchange and f/u appt.  Preferred DME:[facility]

## 2025-02-18 DIAGNOSIS — M25.562 LEFT KNEE PAIN, UNSPECIFIED CHRONICITY: Primary | ICD-10-CM

## 2025-02-25 ENCOUNTER — OFFICE VISIT (OUTPATIENT)
Dept: ORTHOPEDIC SURGERY | Age: 65
End: 2025-02-25

## 2025-02-25 VITALS — WEIGHT: 155 LBS | RESPIRATION RATE: 18 BRPM | BODY MASS INDEX: 26.46 KG/M2 | OXYGEN SATURATION: 99 % | HEIGHT: 64 IN

## 2025-02-25 DIAGNOSIS — M17.12 PRIMARY OSTEOARTHRITIS OF LEFT KNEE: Primary | ICD-10-CM

## 2025-02-25 DIAGNOSIS — M17.11 PRIMARY OSTEOARTHRITIS OF RIGHT KNEE: ICD-10-CM

## 2025-02-25 RX ORDER — TRIAMCINOLONE ACETONIDE 40 MG/ML
40 INJECTION, SUSPENSION INTRA-ARTICULAR; INTRAMUSCULAR ONCE
Status: COMPLETED | OUTPATIENT
Start: 2025-02-25 | End: 2025-02-25

## 2025-02-25 RX ADMIN — TRIAMCINOLONE ACETONIDE 40 MG: 40 INJECTION, SUSPENSION INTRA-ARTICULAR; INTRAMUSCULAR at 15:18

## 2025-02-25 NOTE — PROGRESS NOTES
lungs, Disp: , Rfl:     cloNIDine (CATAPRES) 0.1 MG tablet, Take 1 tablet by mouth in the morning, at noon, and at bedtime, Disp: , Rfl:     albuterol (PROVENTIL) (2.5 MG/3ML) 0.083% nebulizer solution, Take 3 mLs by nebulization every 6 hours as needed for Wheezing, Disp: , Rfl:     Multiple Vitamins-Minerals (THERAPEUTIC MULTIVITAMIN-MINERALS) tablet, Take 1 tablet by mouth daily, Disp: , Rfl:     Omega-3 Fatty Acids (FISH OIL) 500 MG CAPS, Take 500 mg by mouth daily, Disp: , Rfl:     verapamil (VERELAN PM) 240 MG CR capsule, Take 1 capsule by mouth 2 times daily, Disp: , Rfl:     potassium chloride SA (K-DUR;KLOR-CON M) 10 MEQ tablet, Take 1 tablet by mouth 2 times daily, Disp: , Rfl:     furosemide (LASIX) 40 MG tablet, Take 1 tablet by mouth in the morning and 1 tablet in the evening., Disp: , Rfl:     ibuprofen (ADVIL;MOTRIN) 600 MG tablet, Take 1 tablet by mouth every 4 hours as needed for Pain, Disp: , Rfl:     loratadine (CLARITIN) 10 MG tablet, Take 1 tablet by mouth daily, Disp: , Rfl:     montelukast (SINGULAIR) 10 MG tablet, Take 1 tablet by mouth nightly, Disp: , Rfl:     theophylline (FRANSISCA-24) 200 MG SR capsule, Take 1 capsule by mouth daily, Disp: , Rfl:     DULoxetine (CYMBALTA) 60 MG capsule, Take 1 capsule by mouth in the morning and 1 capsule in the evening., Disp: , Rfl:   No Known Allergies  Social History     Socioeconomic History    Marital status:      Spouse name: maureen    Number of children: 6    Years of education: 14    Highest education level: Not on file   Occupational History    Not on file   Tobacco Use    Smoking status: Every Day     Current packs/day: 0.00     Average packs/day: 0.3 packs/day for 15.0 years (3.8 ttl pk-yrs)     Types: Cigarettes     Start date: 1992     Last attempt to quit: 2007     Years since quittin.7    Smokeless tobacco: Never   Substance and Sexual Activity    Alcohol use: Yes     Alcohol/week: 1.0 standard drink of alcohol     Types:

## 2025-03-18 DIAGNOSIS — M25.512 LEFT SHOULDER PAIN, UNSPECIFIED CHRONICITY: Primary | ICD-10-CM

## 2025-05-05 ENCOUNTER — OFFICE VISIT (OUTPATIENT)
Dept: ORTHOPEDIC SURGERY | Age: 65
End: 2025-05-05
Payer: MEDICARE

## 2025-05-05 VITALS — BODY MASS INDEX: 26.46 KG/M2 | HEIGHT: 64 IN | WEIGHT: 155 LBS

## 2025-05-05 DIAGNOSIS — M17.12 PRIMARY OSTEOARTHRITIS OF LEFT KNEE: Primary | ICD-10-CM

## 2025-05-05 PROCEDURE — 99214 OFFICE O/P EST MOD 30 MIN: CPT | Performed by: ORTHOPAEDIC SURGERY

## 2025-05-05 RX ORDER — SEMAGLUTIDE 0.68 MG/ML
INJECTION, SOLUTION SUBCUTANEOUS
COMMUNITY

## 2025-05-05 NOTE — PROGRESS NOTES
Chief Complaint   Patient presents with    Knee Pain     Left knee pain. LOV 2/25/25 with B/L knee CSI. Patient presents today with continued complaints of constant left knee pain and frequent episodes of popping out.        Subjective:     Patient ID: Micheline Prince is a 64 y.o..  female    Knee Pain  Patient presented for follow up of left knee pain. She stated that she had injections done with minimal relief. She c/o popping and increased pain when ambulating. The patient has failed all attempts at conservative treatment including: cortisone injection, visco injections, zilretta injections, physician directed HEP, nsaids, pain medication, OTC medication, ice, heat, use of assistive device and activity restriction.      Past Medical History:   Diagnosis Date    Apnea     Arthritis     Asthma     COPD (chronic obstructive pulmonary disease) ([facility])     History of cardiovascular stress test 12/13/2017    lexiscan stress test    Hypertension     Pneumonia 3/29/2014     Past Surgical History:   Procedure Laterality Date    BUNIONECTOMY      both feet    HERNIA REPAIR      TUBAL LIGATION      URETER REVISION         Current Outpatient Medications:     OZEMPIC, 0.25 OR 0.5 MG/DOSE, 2 MG/3ML SOPN, INJECT 0.5 MG UNDER THE SKIN ONCE PER WEEK, Disp: , Rfl:     buPROPion (WELLBUTRIN SR) 150 MG extended release tablet, take 1 tablet by mouth twice a day, Disp: , Rfl:     hydrOXYzine pamoate (VISTARIL) 50 MG capsule, take 1 capsule by mouth four times a day, Disp: , Rfl:     omeprazole (PRILOSEC) 40 MG delayed release capsule, take 1 capsule by mouth once daily, Disp: , Rfl:     gabapentin (NEURONTIN) 300 MG capsule, take 1 capsule by mouth twice a day, Disp: , Rfl:     tiotropium (SPIRIVA RESPIMAT) 2.5 MCG/ACT AERS inhaler, Inhale 2 puffs into the lungs, Disp: , Rfl:     cloNIDine (CATAPRES) 0.1 MG tablet, Take 1 tablet by mouth in the morning, at noon, and at bedtime, Disp: , Rfl:     albuterol (PROVENTIL) (2.5

## 2025-05-14 DIAGNOSIS — M17.12 PRIMARY OSTEOARTHRITIS OF LEFT KNEE: Primary | ICD-10-CM

## 2025-05-21 ENCOUNTER — PREP FOR PROCEDURE (OUTPATIENT)
Dept: ORTHOPEDIC SURGERY | Age: 65
End: 2025-05-21

## 2025-05-21 PROBLEM — M17.12 LEFT KNEE DJD: Status: ACTIVE | Noted: 2025-05-21

## 2025-05-30 ENCOUNTER — HOSPITAL ENCOUNTER (OUTPATIENT)
Dept: GENERAL RADIOLOGY | Age: 65
End: 2025-05-30
Payer: MEDICARE

## 2025-05-30 ENCOUNTER — HOSPITAL ENCOUNTER (OUTPATIENT)
Dept: PREADMISSION TESTING | Age: 65
Discharge: HOME | End: 2025-05-30
Payer: MEDICARE

## 2025-05-30 ENCOUNTER — ANESTHESIA EVENT (OUTPATIENT)
Dept: OPERATING ROOM | Age: 65
End: 2025-05-30
Payer: MEDICARE

## 2025-05-30 VITALS
WEIGHT: 161.6 LBS | OXYGEN SATURATION: 98 % | HEART RATE: 70 BPM | SYSTOLIC BLOOD PRESSURE: 138 MMHG | HEIGHT: 64 IN | DIASTOLIC BLOOD PRESSURE: 85 MMHG | RESPIRATION RATE: 18 BRPM | BODY MASS INDEX: 27.59 KG/M2 | TEMPERATURE: 97.9 F

## 2025-05-30 DIAGNOSIS — Z01.818 PRE-OP EVALUATION: ICD-10-CM

## 2025-05-30 LAB
ALBUMIN SERPL-MCNC: 4.3 G/DL (ref 3.5–5.2)
ALP SERPL-CCNC: 103 U/L (ref 35–104)
ALT SERPL-CCNC: 11 U/L (ref 0–32)
ANION GAP SERPL CALCULATED.3IONS-SCNC: 10 MMOL/L (ref 7–16)
AST SERPL-CCNC: 15 U/L (ref 0–31)
BACTERIA URNS QL MICRO: ABNORMAL
BASOPHILS # BLD: 0.04 K/UL (ref 0–0.2)
BASOPHILS NFR BLD: 1 % (ref 0–2)
BILIRUB SERPL-MCNC: 0.3 MG/DL (ref 0–1.2)
BILIRUB UR QL STRIP: NEGATIVE
BUN SERPL-MCNC: 14 MG/DL (ref 6–23)
CALCIUM SERPL-MCNC: 9.6 MG/DL (ref 8.6–10.2)
CHLORIDE SERPL-SCNC: 101 MMOL/L (ref 98–107)
CLARITY UR: CLEAR
CO2 SERPL-SCNC: 29 MMOL/L (ref 22–29)
COLOR UR: YELLOW
CREAT SERPL-MCNC: 1 MG/DL (ref 0.5–1)
EKG ATRIAL RATE: 60 BPM
EKG P AXIS: 49 DEGREES
EKG P-R INTERVAL: 122 MS
EKG Q-T INTERVAL: 424 MS
EKG QRS DURATION: 86 MS
EKG QTC CALCULATION (BAZETT): 424 MS
EKG R AXIS: 0 DEGREES
EKG T AXIS: 5 DEGREES
EKG VENTRICULAR RATE: 60 BPM
EOSINOPHIL # BLD: 0.04 K/UL (ref 0.05–0.5)
EOSINOPHILS RELATIVE PERCENT: 1 % (ref 0–6)
ERYTHROCYTE [DISTWIDTH] IN BLOOD BY AUTOMATED COUNT: 14.2 % (ref 11.5–15)
GFR, ESTIMATED: 67 ML/MIN/1.73M2
GLUCOSE SERPL-MCNC: 94 MG/DL (ref 74–99)
GLUCOSE UR STRIP-MCNC: NEGATIVE MG/DL
HBA1C MFR BLD: 5.7 % (ref 4–5.6)
HCT VFR BLD AUTO: 41.5 % (ref 34–48)
HGB BLD-MCNC: 13.2 G/DL (ref 11.5–15.5)
HGB UR QL STRIP.AUTO: NEGATIVE
IMM GRANULOCYTES # BLD AUTO: <0.03 K/UL (ref 0–0.58)
IMM GRANULOCYTES NFR BLD: 0 % (ref 0–5)
INR PPP: 1.1
KETONES UR STRIP-MCNC: NEGATIVE MG/DL
LEUKOCYTE ESTERASE UR QL STRIP: NEGATIVE
LYMPHOCYTES NFR BLD: 1.87 K/UL (ref 1.5–4)
LYMPHOCYTES RELATIVE PERCENT: 38 % (ref 20–42)
MCH RBC QN AUTO: 27.7 PG (ref 26–35)
MCHC RBC AUTO-ENTMCNC: 31.8 G/DL (ref 32–34.5)
MCV RBC AUTO: 87.2 FL (ref 80–99.9)
MONOCYTES NFR BLD: 0.58 K/UL (ref 0.1–0.95)
MONOCYTES NFR BLD: 12 % (ref 2–12)
NEUTROPHILS NFR BLD: 48 % (ref 43–80)
NEUTS SEG NFR BLD: 2.34 K/UL (ref 1.8–7.3)
NITRITE UR QL STRIP: POSITIVE
PARTIAL THROMBOPLASTIN TIME: 33.2 SEC (ref 24.5–35.1)
PH UR STRIP: 5.5 [PH] (ref 5–8)
PLATELET # BLD AUTO: 223 K/UL (ref 130–450)
PMV BLD AUTO: 10.9 FL (ref 7–12)
POTASSIUM SERPL-SCNC: 4.2 MMOL/L (ref 3.5–5)
PREALB SERPL-MCNC: 22.2 MG/DL (ref 20–40)
PROT SERPL-MCNC: 6.8 G/DL (ref 6.4–8.3)
PROT UR STRIP-MCNC: NEGATIVE MG/DL
PROTHROMBIN TIME: 11.5 SEC (ref 9.3–12.4)
RBC # BLD AUTO: 4.76 M/UL (ref 3.5–5.5)
RBC #/AREA URNS HPF: ABNORMAL /HPF
SODIUM SERPL-SCNC: 140 MMOL/L (ref 132–146)
SP GR UR STRIP: >1.03 (ref 1–1.03)
UROBILINOGEN UR STRIP-ACNC: 0.2 EU/DL (ref 0–1)
WBC #/AREA URNS HPF: ABNORMAL /HPF
WBC OTHER # BLD: 4.9 K/UL (ref 4.5–11.5)

## 2025-05-30 PROCEDURE — 85610 PROTHROMBIN TIME: CPT

## 2025-05-30 PROCEDURE — 87081 CULTURE SCREEN ONLY: CPT

## 2025-05-30 PROCEDURE — 83036 HEMOGLOBIN GLYCOSYLATED A1C: CPT

## 2025-05-30 PROCEDURE — 87086 URINE CULTURE/COLONY COUNT: CPT

## 2025-05-30 PROCEDURE — 84134 ASSAY OF PREALBUMIN: CPT

## 2025-05-30 PROCEDURE — 71046 X-RAY EXAM CHEST 2 VIEWS: CPT

## 2025-05-30 PROCEDURE — 81001 URINALYSIS AUTO W/SCOPE: CPT

## 2025-05-30 PROCEDURE — 80053 COMPREHEN METABOLIC PANEL: CPT

## 2025-05-30 PROCEDURE — 85025 COMPLETE CBC W/AUTO DIFF WBC: CPT

## 2025-05-30 PROCEDURE — 85730 THROMBOPLASTIN TIME PARTIAL: CPT

## 2025-05-30 NOTE — ANESTHESIA PRE PROCEDURE
Department of Anesthesiology  Preprocedure Note       Name:  Micheline Prince   Age:  64 y.o.  :  1960                                          MRN:  [id number]         Date:  2025      Surgeon: Surgeon(s):  Bob Mittal DO    Procedure: Procedure(s):  LEFT KNEE TOTAL KNEE ARTHROPLASTY-25 JOVAN ROBLES    Medications prior to admission:   Prior to Admission medications    Medication Sig Start Date End Date Taking? Authorizing Provider   OZEMPIC, 0.25 OR 0.5 MG/DOSE, 2 MG/3ML SOPN INJECT 0.5 MG UNDER THE SKIN ONCE PER WEEK    Bubba Wilson MD   buPROPion (WELLBUTRIN SR) 150 MG extended release tablet take 1 tablet by mouth twice a day 10/6/22   Bubba Wilson MD   hydrOXYzine pamoate (VISTARIL) 50 MG capsule take 1 capsule by mouth four times a day 10/6/22   Bubba Wilson MD   omeprazole (PRILOSEC) 40 MG delayed release capsule take 1 capsule by mouth once daily 10/7/22   Bubba Wilson MD   gabapentin (NEURONTIN) 300 MG capsule take 1 capsule by mouth twice a day 22   Bubba Wilson MD   tiotropium (SPIRIVA RESPIMAT) 2.5 MCG/ACT AERS inhaler Inhale 2 puffs into the lungs    Bubba Wilson MD   cloNIDine (CATAPRES) 0.1 MG tablet Take 1 tablet by mouth in the morning, at noon, and at bedtime    Bubba Wilson MD   albuterol (PROVENTIL) (2.5 MG/3ML) 0.083% nebulizer solution Take 3 mLs by nebulization every 6 hours as needed for Wheezing    Bubba Wilson MD   Multiple Vitamins-Minerals (THERAPEUTIC MULTIVITAMIN-MINERALS) tablet Take 1 tablet by mouth daily    Bubba Wilson MD   Omega-3 Fatty Acids (FISH OIL) 500 MG CAPS Take 500 mg by mouth daily    Bubba Wilson MD   verapamil (VERELAN PM) 240 MG CR capsule Take 1 capsule by mouth 2 times daily    Bubba Wilson MD   potassium chloride SA (K-DUR;KLOR-CON M) 10 MEQ tablet Take 1 tablet by mouth 2 times daily    Bubba Wilson MD   furosemide (LASIX) 40 MG

## 2025-05-30 NOTE — PROGRESS NOTES
[facility]                                                                                                                    PRE OP INSTRUCTIONS FOR  Micheline Prince        Date: 5/30/2025    Date of surgery: 6/11/25 Arrival Time: Hospital will call you between 5pm and 7pm on 6/10/25 with your final arrival time for surgery. Go to [facility] and check in at information desk.    Nothing by mouth (NPO) as instructed. May have clear liquids up to 2 hours prior to surgery. Nothing solid after midnight. Examples: water, apple juice, black coffee, plain tea    Take the following medications with a small sip of water on the morning of Surgery: omeprazole, hydroxyzine, albuterol, spiriva, montelukast, theophylline, gabapentin, bupropion, duloxatine, loratadine, verapamil, clonidine     Diabetics may take half the evening dose of insulin but none after midnight.  If you feel symptomatic or have low blood sugar morning of surgery drink 1-2 ounces of apple juice only. If you take a weekly insulin injection ozempic, stop 7 days prior to surgery. If you take _______________, stop 3-4 days prior to surgery.    Aspirin, Ibuprofen, Advil, Naproxen, other Anti-inflammatory products should be stopped before surgery as directed by your surgeon, cardiologist, or primary care Doctor. Herbal supplements and Vitamin E should be stopped five days prior.  May take Tylenol unless instructed otherwise by your surgeon.    Check with your Doctor regarding stopping Plavix, Coumadin, Lovenox, Eliquis, Effient, or other blood thinners such as, pradaxa, lixiana, xaralto and savaysa.    Do not smoke, chew tobacco, vape, or use illicit drugs and do not drink any alcoholic beverages 24 hours prior to surgery.    You may brush your teeth the morning of surgery.      You MUST make arrangements for a responsible adult, 18 and over, to take you home after your surgery. You will not be allowed to leave alone or

## 2025-05-30 NOTE — PROGRESS NOTES
Patient attended preoperative Total Joint Camp on 5/30/25.  Patient is scheduled to have an elective knee replacement.  Patient was educated regarding Disease Process, Medications, Smoking Cessation, Oxygenation, Incentive Spirometry and Deep Breath and Cough, signs and symptoms of postoperative joint infection that include: Fever, Chills, Pain Control, Drainage and Redness, post-op follow up with orthopaedic surgeon, dressing removal, staple removal, ambulatory devices which include a wheeled walker and cane, bed mobility, correct anatomical alignment, active range of motion, proper transferring technique, incision care, infection prevention measures, non-pharmacologic comfort measures, notification of inadequate pain control measures, pain scale for assessing level of pain, pharmacologic pain management, relaxation techniques.

## 2025-05-31 LAB
MICROORGANISM SPEC CULT: ABNORMAL
MICROORGANISM SPEC CULT: ABNORMAL
SERVICE CMNT-IMP: ABNORMAL
SERVICE CMNT-IMP: ABNORMAL
SPECIMEN DESCRIPTION: ABNORMAL
SPECIMEN DESCRIPTION: ABNORMAL

## 2025-06-02 LAB
EKG ATRIAL RATE: 60 BPM
EKG P AXIS: 49 DEGREES
EKG P-R INTERVAL: 122 MS
EKG Q-T INTERVAL: 424 MS
EKG QRS DURATION: 86 MS
EKG QTC CALCULATION (BAZETT): 424 MS
EKG R AXIS: 0 DEGREES
EKG T AXIS: 5 DEGREES
EKG VENTRICULAR RATE: 60 BPM

## 2025-06-02 ASSESSMENT — PROMIS GLOBAL HEALTH SCALE
IN GENERAL, HOW WOULD YOU RATE YOUR PHYSICAL HEALTH [ON A SCALE OF 1 (POOR) TO 5 (EXCELLENT)]?: VERY GOOD
WHO IS THE PERSON COMPLETING THE PROMIS V1.1 SURVEY?: SELF
IN GENERAL, WOULD YOU SAY YOUR QUALITY OF LIFE IS...[ON A SCALE OF 1 (POOR) TO 5 (EXCELLENT)]: VERY GOOD
IN GENERAL, HOW WOULD YOU RATE YOUR MENTAL HEALTH, INCLUDING YOUR MOOD AND YOUR ABILITY TO THINK [ON A SCALE OF 1 (POOR) TO 5 (EXCELLENT)]?: VERY GOOD
IN GENERAL, WOULD YOU SAY YOUR HEALTH IS...[ON A SCALE OF 1 (POOR) TO 5 (EXCELLENT)]: VERY GOOD
IN THE PAST 7 DAYS, HOW OFTEN HAVE YOU BEEN BOTHERED BY EMOTIONAL PROBLEMS, SUCH AS FEELING ANXIOUS, DEPRESSED, OR IRRITABLE [ON A SCALE FROM 1 (NEVER) TO 5 (ALWAYS)]?: SOMETIMES
HOW IS THE PROMIS V1.1 BEING ADMINISTERED?: PAPER
IN GENERAL, HOW WOULD YOU RATE YOUR SATISFACTION WITH YOUR SOCIAL ACTIVITIES AND RELATIONSHIPS [ON A SCALE OF 1 (POOR) TO 5 (EXCELLENT)]?: EXCELLENT
SUM OF RESPONSES TO QUESTIONS 3, 6, 7, & 8: 23
IN THE PAST 7 DAYS, HOW WOULD YOU RATE YOUR PAIN ON AVERAGE [ON A SCALE FROM 0 (NO PAIN) TO 10 (WORST IMAGINABLE PAIN)]?: 10 WORST IMAGINABLE PAIN
IN GENERAL, PLEASE RATE HOW WELL YOU CARRY OUT YOUR USUAL SOCIAL ACTIVITIES (INCLUDES ACTIVITIES AT HOME, AT WORK, AND IN YOUR COMMUNITY, AND RESPONSIBILITIES AS A PARENT, CHILD, SPOUSE, EMPLOYEE, FRIEND, ETC) [ON A SCALE OF 1 (POOR) TO 5 (EXCELLENT)]?: EXCELLENT
IN THE PAST 7 DAYS, HOW WOULD YOU RATE YOUR FATIGUE ON AVERAGE [ON A SCALE FROM 1 (NONE) TO 5 (VERY SEVERE)]?: MILD
TO WHAT EXTENT ARE YOU ABLE TO CARRY OUT YOUR EVERYDAY PHYSICAL ACTIVITIES SUCH AS WALKING, CLIMBING STAIRS, CARRYING GROCERIES, OR MOVING A CHAIR [ON A SCALE OF 1 (NOT AT ALL) TO 5 (COMPLETELY)]?: COMPLETELY
SUM OF RESPONSES TO QUESTIONS 2, 4, 5, & 10: 16

## 2025-06-02 ASSESSMENT — KOOS JR
BENDING TO THE FLOOR TO PICK UP OBJECT: MILD
STRAIGHTENING KNEE FULLY: MILD
GOING UP OR DOWN STAIRS: MILD
HOW SEVERE IS YOUR KNEE STIFFNESS AFTER FIRST WAKING IN MORNING: MILD
RISING FROM SITTING: MILD
STANDING UPRIGHT: MILD
TWISING OR PIVOTING ON KNEE: MILD
KOOS JR TOTAL INTERVAL SCORE: 68.284

## 2025-06-02 NOTE — PROGRESS NOTES
Christelle at Dr. Mittal office called re: positive MRSA, I also asked her about medical clearance from Dr. Mtz- she will check on it.

## 2025-06-10 ENCOUNTER — TELEPHONE (OUTPATIENT)
Dept: ORTHOPEDIC SURGERY | Age: 65
End: 2025-06-10

## 2025-06-10 DIAGNOSIS — Z22.322 NOSE COLONIZED WITH MRSA: Primary | ICD-10-CM

## 2025-06-10 RX ORDER — MUPIROCIN 2 %
OINTMENT (GRAM) TOPICAL
Qty: 1 G | Refills: 0 | Status: SHIPPED | OUTPATIENT
Start: 2025-06-10

## 2025-06-10 NOTE — TELEPHONE ENCOUNTER
Pre admission testing called and stated patient tested positive for MRSA. Stated they called last week  to get her started on medication but nothing has been started yet. Surgery scheduled for tomorrow. Please advise.

## 2025-06-11 ENCOUNTER — APPOINTMENT (OUTPATIENT)
Dept: GENERAL RADIOLOGY | Age: 65
End: 2025-06-11
Attending: ORTHOPAEDIC SURGERY
Payer: MEDICARE

## 2025-06-11 ENCOUNTER — ANESTHESIA (OUTPATIENT)
Dept: OPERATING ROOM | Age: 65
End: 2025-06-11
Payer: MEDICARE

## 2025-06-11 ENCOUNTER — HOSPITAL ENCOUNTER (OUTPATIENT)
Age: 65
Setting detail: OUTPATIENT SURGERY
Discharge: HOME | End: 2025-06-11
Attending: ORTHOPAEDIC SURGERY | Admitting: ORTHOPAEDIC SURGERY
Payer: MEDICARE

## 2025-06-11 VITALS
BODY MASS INDEX: 27.49 KG/M2 | RESPIRATION RATE: 16 BRPM | HEART RATE: 74 BPM | TEMPERATURE: 98 F | WEIGHT: 161 LBS | OXYGEN SATURATION: 96 % | DIASTOLIC BLOOD PRESSURE: 91 MMHG | SYSTOLIC BLOOD PRESSURE: 156 MMHG | HEIGHT: 64 IN

## 2025-06-11 DIAGNOSIS — M17.12 PRIMARY OSTEOARTHRITIS OF LEFT KNEE: Primary | ICD-10-CM

## 2025-06-11 DIAGNOSIS — M17.12 LEFT KNEE DJD: ICD-10-CM

## 2025-06-11 DIAGNOSIS — Z01.818 PRE-OP EVALUATION: ICD-10-CM

## 2025-06-11 PROCEDURE — 97165 OT EVAL LOW COMPLEX 30 MIN: CPT

## 2025-06-11 PROCEDURE — C1776 JOINT DEVICE (IMPLANTABLE): HCPCS | Performed by: ORTHOPAEDIC SURGERY

## 2025-06-11 PROCEDURE — 2709999900 HC NON-CHARGEABLE SUPPLY: Performed by: ORTHOPAEDIC SURGERY

## 2025-06-11 PROCEDURE — 0055T BONE SRGRY CMPTR CT/MRI IMAG: CPT | Performed by: ORTHOPAEDIC SURGERY

## 2025-06-11 PROCEDURE — 6360000002 HC RX W HCPCS: Performed by: NURSE ANESTHETIST, CERTIFIED REGISTERED

## 2025-06-11 PROCEDURE — 88311 DECALCIFY TISSUE: CPT

## 2025-06-11 PROCEDURE — 3600000015 HC SURGERY LEVEL 5 ADDTL 15MIN: Performed by: ORTHOPAEDIC SURGERY

## 2025-06-11 PROCEDURE — 2580000003 HC RX 258: Performed by: ORTHOPAEDIC SURGERY

## 2025-06-11 PROCEDURE — 3700000001 HC ADD 15 MINUTES (ANESTHESIA): Performed by: ORTHOPAEDIC SURGERY

## 2025-06-11 PROCEDURE — 6360000002 HC RX W HCPCS

## 2025-06-11 PROCEDURE — 73560 X-RAY EXAM OF KNEE 1 OR 2: CPT

## 2025-06-11 PROCEDURE — 7100000001 HC PACU RECOVERY - ADDTL 15 MIN: Performed by: ORTHOPAEDIC SURGERY

## 2025-06-11 PROCEDURE — 2500000003 HC RX 250 WO HCPCS

## 2025-06-11 PROCEDURE — 2500000003 HC RX 250 WO HCPCS: Performed by: ORTHOPAEDIC SURGERY

## 2025-06-11 PROCEDURE — 7100000011 HC PHASE II RECOVERY - ADDTL 15 MIN: Performed by: ORTHOPAEDIC SURGERY

## 2025-06-11 PROCEDURE — 6360000002 HC RX W HCPCS: Performed by: ORTHOPAEDIC SURGERY

## 2025-06-11 PROCEDURE — 2720000010 HC SURG SUPPLY STERILE: Performed by: ORTHOPAEDIC SURGERY

## 2025-06-11 PROCEDURE — 3600000005 HC SURGERY LEVEL 5 BASE: Performed by: ORTHOPAEDIC SURGERY

## 2025-06-11 PROCEDURE — 97161 PT EVAL LOW COMPLEX 20 MIN: CPT | Performed by: PHYSICAL THERAPIST

## 2025-06-11 PROCEDURE — 64447 NJX AA&/STRD FEMORAL NRV IMG: CPT | Performed by: ANESTHESIOLOGY

## 2025-06-11 PROCEDURE — 97116 GAIT TRAINING THERAPY: CPT | Performed by: PHYSICAL THERAPIST

## 2025-06-11 PROCEDURE — 7100000010 HC PHASE II RECOVERY - FIRST 15 MIN: Performed by: ORTHOPAEDIC SURGERY

## 2025-06-11 PROCEDURE — 2580000003 HC RX 258

## 2025-06-11 PROCEDURE — C1713 ANCHOR/SCREW BN/BN,TIS/BN: HCPCS | Performed by: ORTHOPAEDIC SURGERY

## 2025-06-11 PROCEDURE — 7100000000 HC PACU RECOVERY - FIRST 15 MIN: Performed by: ORTHOPAEDIC SURGERY

## 2025-06-11 PROCEDURE — 2580000003 HC RX 258: Performed by: ANESTHESIOLOGY

## 2025-06-11 PROCEDURE — 97535 SELF CARE MNGMENT TRAINING: CPT

## 2025-06-11 PROCEDURE — 6370000000 HC RX 637 (ALT 250 FOR IP)

## 2025-06-11 PROCEDURE — 3700000000 HC ANESTHESIA ATTENDED CARE: Performed by: ORTHOPAEDIC SURGERY

## 2025-06-11 PROCEDURE — 6370000000 HC RX 637 (ALT 250 FOR IP): Performed by: ORTHOPAEDIC SURGERY

## 2025-06-11 PROCEDURE — 27447 TOTAL KNEE ARTHROPLASTY: CPT | Performed by: ORTHOPAEDIC SURGERY

## 2025-06-11 PROCEDURE — 6360000002 HC RX W HCPCS: Performed by: ANESTHESIOLOGY

## 2025-06-11 PROCEDURE — 97530 THERAPEUTIC ACTIVITIES: CPT | Performed by: PHYSICAL THERAPIST

## 2025-06-11 PROCEDURE — 88305 TISSUE EXAM BY PATHOLOGIST: CPT

## 2025-06-11 DEVICE — TIBIAL BEARING INSERT - CS
Type: IMPLANTABLE DEVICE | Site: KNEE | Status: FUNCTIONAL
Brand: TRIATHLON

## 2025-06-11 DEVICE — FULL DOSE BONE CEMENT, 10 PACK CATALOG NUMBER IS 6191-1-010
Type: IMPLANTABLE DEVICE | Site: KNEE | Status: FUNCTIONAL
Brand: SIMPLEX

## 2025-06-11 DEVICE — SYMMETRIC PATELLA
Type: IMPLANTABLE DEVICE | Site: KNEE | Status: FUNCTIONAL
Brand: TRIATHLON

## 2025-06-11 DEVICE — TIBIAL COMPONENT
Type: IMPLANTABLE DEVICE | Site: KNEE | Status: FUNCTIONAL
Brand: TRIATHLON

## 2025-06-11 DEVICE — CRUCIATE RETAINING FEMORAL
Type: IMPLANTABLE DEVICE | Site: KNEE | Status: FUNCTIONAL
Brand: TRIATHLON

## 2025-06-11 RX ORDER — DEXAMETHASONE SODIUM PHOSPHATE 10 MG/ML
INJECTION, SOLUTION INTRAMUSCULAR; INTRAVENOUS
Status: DISCONTINUED | OUTPATIENT
Start: 2025-06-11 | End: 2025-06-11 | Stop reason: SDUPTHER

## 2025-06-11 RX ORDER — SODIUM CHLORIDE 0.9 % (FLUSH) 0.9 %
5-40 SYRINGE (ML) INJECTION PRN
Status: DISCONTINUED | OUTPATIENT
Start: 2025-06-11 | End: 2025-06-11 | Stop reason: HOSPADM

## 2025-06-11 RX ORDER — MELOXICAM 7.5 MG/1
7.5 TABLET ORAL DAILY
Status: CANCELLED | OUTPATIENT
Start: 2025-06-11 | End: 2025-06-14

## 2025-06-11 RX ORDER — SODIUM CHLORIDE 9 MG/ML
INJECTION, SOLUTION INTRAVENOUS PRN
Status: DISCONTINUED | OUTPATIENT
Start: 2025-06-11 | End: 2025-06-11 | Stop reason: HOSPADM

## 2025-06-11 RX ORDER — HYDROXYZINE PAMOATE 50 MG/1
50 CAPSULE ORAL 4 TIMES DAILY
Status: CANCELLED | OUTPATIENT
Start: 2025-06-11

## 2025-06-11 RX ORDER — CLONIDINE HYDROCHLORIDE 0.1 MG/1
0.1 TABLET ORAL 3 TIMES DAILY
Status: CANCELLED | OUTPATIENT
Start: 2025-06-11

## 2025-06-11 RX ORDER — HYDRALAZINE HYDROCHLORIDE 20 MG/ML
10 INJECTION INTRAMUSCULAR; INTRAVENOUS
Status: DISCONTINUED | OUTPATIENT
Start: 2025-06-11 | End: 2025-06-11 | Stop reason: HOSPADM

## 2025-06-11 RX ORDER — GABAPENTIN 100 MG/1
100 CAPSULE ORAL 3 TIMES DAILY
Qty: 90 CAPSULE | Refills: 0 | Status: SHIPPED | OUTPATIENT
Start: 2025-06-11 | End: 2025-07-11

## 2025-06-11 RX ORDER — DULOXETIN HYDROCHLORIDE 60 MG/1
60 CAPSULE, DELAYED RELEASE ORAL 2 TIMES DAILY
Status: CANCELLED | OUTPATIENT
Start: 2025-06-11

## 2025-06-11 RX ORDER — ROPIVACAINE HYDROCHLORIDE 5 MG/ML
INJECTION, SOLUTION EPIDURAL; INFILTRATION; PERINEURAL
Status: COMPLETED | OUTPATIENT
Start: 2025-06-11 | End: 2025-06-11

## 2025-06-11 RX ORDER — SODIUM CHLORIDE 0.9 % (FLUSH) 0.9 %
5-40 SYRINGE (ML) INJECTION EVERY 12 HOURS SCHEDULED
Status: CANCELLED | OUTPATIENT
Start: 2025-06-11

## 2025-06-11 RX ORDER — POTASSIUM CHLORIDE 750 MG/1
10 TABLET, EXTENDED RELEASE ORAL 2 TIMES DAILY
Status: CANCELLED | OUTPATIENT
Start: 2025-06-11

## 2025-06-11 RX ORDER — SODIUM CHLORIDE 0.9 % (FLUSH) 0.9 %
5-40 SYRINGE (ML) INJECTION EVERY 12 HOURS SCHEDULED
Status: DISCONTINUED | OUTPATIENT
Start: 2025-06-11 | End: 2025-06-11 | Stop reason: HOSPADM

## 2025-06-11 RX ORDER — PANTOPRAZOLE SODIUM 40 MG/1
40 TABLET, DELAYED RELEASE ORAL
Status: CANCELLED | OUTPATIENT
Start: 2025-06-11

## 2025-06-11 RX ORDER — MEPERIDINE HYDROCHLORIDE 25 MG/ML
12.5 INJECTION INTRAMUSCULAR; INTRAVENOUS; SUBCUTANEOUS EVERY 5 MIN PRN
Status: DISCONTINUED | OUTPATIENT
Start: 2025-06-11 | End: 2025-06-11 | Stop reason: HOSPADM

## 2025-06-11 RX ORDER — LABETALOL HYDROCHLORIDE 5 MG/ML
10 INJECTION, SOLUTION INTRAVENOUS
Status: DISCONTINUED | OUTPATIENT
Start: 2025-06-11 | End: 2025-06-11 | Stop reason: HOSPADM

## 2025-06-11 RX ORDER — OXYCODONE HYDROCHLORIDE 5 MG/1
10 TABLET ORAL EVERY 4 HOURS PRN
Status: DISCONTINUED | OUTPATIENT
Start: 2025-06-11 | End: 2025-06-11 | Stop reason: HOSPADM

## 2025-06-11 RX ORDER — FUROSEMIDE 40 MG/1
40 TABLET ORAL 2 TIMES DAILY
Status: CANCELLED | OUTPATIENT
Start: 2025-06-11

## 2025-06-11 RX ORDER — MIDAZOLAM HYDROCHLORIDE 1 MG/ML
2 INJECTION, SOLUTION INTRAMUSCULAR; INTRAVENOUS
Status: DISCONTINUED | OUTPATIENT
Start: 2025-06-11 | End: 2025-06-11 | Stop reason: HOSPADM

## 2025-06-11 RX ORDER — OXYCODONE AND ACETAMINOPHEN 10; 325 MG/1; MG/1
1 TABLET ORAL EVERY 6 HOURS PRN
Qty: 28 TABLET | Refills: 0 | Status: SHIPPED | OUTPATIENT
Start: 2025-06-11 | End: 2025-06-19 | Stop reason: SDUPTHER

## 2025-06-11 RX ORDER — PROPOFOL 10 MG/ML
INJECTION, EMULSION INTRAVENOUS
Status: DISCONTINUED | OUTPATIENT
Start: 2025-06-11 | End: 2025-06-11 | Stop reason: SDUPTHER

## 2025-06-11 RX ORDER — SODIUM CHLORIDE 0.9 % (FLUSH) 0.9 %
5-40 SYRINGE (ML) INJECTION PRN
Status: CANCELLED | OUTPATIENT
Start: 2025-06-11

## 2025-06-11 RX ORDER — CEPHALEXIN 500 MG/1
500 CAPSULE ORAL 3 TIMES DAILY
Qty: 21 CAPSULE | Refills: 0 | Status: SHIPPED | OUTPATIENT
Start: 2025-06-11 | End: 2025-06-18

## 2025-06-11 RX ORDER — FENTANYL CITRATE 50 UG/ML
INJECTION, SOLUTION INTRAMUSCULAR; INTRAVENOUS
Status: COMPLETED
Start: 2025-06-11 | End: 2025-06-11

## 2025-06-11 RX ORDER — BUPROPION HYDROCHLORIDE 150 MG/1
150 TABLET, EXTENDED RELEASE ORAL 2 TIMES DAILY
Status: CANCELLED | OUTPATIENT
Start: 2025-06-11

## 2025-06-11 RX ORDER — GLYCOPYRROLATE 0.2 MG/ML
INJECTION INTRAMUSCULAR; INTRAVENOUS
Status: DISCONTINUED | OUTPATIENT
Start: 2025-06-11 | End: 2025-06-11 | Stop reason: SDUPTHER

## 2025-06-11 RX ORDER — ACETAMINOPHEN 500 MG
1000 TABLET ORAL ONCE
Status: COMPLETED | OUTPATIENT
Start: 2025-06-11 | End: 2025-06-11

## 2025-06-11 RX ORDER — MONTELUKAST SODIUM 10 MG/1
10 TABLET ORAL NIGHTLY
Status: CANCELLED | OUTPATIENT
Start: 2025-06-12

## 2025-06-11 RX ORDER — VERAPAMIL HYDROCHLORIDE 240 MG/1
240 CAPSULE, DELAYED RELEASE ORAL 2 TIMES DAILY
Status: CANCELLED | OUTPATIENT
Start: 2025-06-11

## 2025-06-11 RX ORDER — DEXTROSE MONOHYDRATE AND SODIUM CHLORIDE 5; .45 G/100ML; G/100ML
INJECTION, SOLUTION INTRAVENOUS CONTINUOUS
Status: CANCELLED | OUTPATIENT
Start: 2025-06-11

## 2025-06-11 RX ORDER — FENTANYL CITRATE 0.05 MG/ML
25 INJECTION, SOLUTION INTRAMUSCULAR; INTRAVENOUS EVERY 5 MIN PRN
Status: DISCONTINUED | OUTPATIENT
Start: 2025-06-11 | End: 2025-06-11 | Stop reason: HOSPADM

## 2025-06-11 RX ORDER — DROPERIDOL 2.5 MG/ML
0.62 INJECTION, SOLUTION INTRAMUSCULAR; INTRAVENOUS
Status: DISCONTINUED | OUTPATIENT
Start: 2025-06-11 | End: 2025-06-11 | Stop reason: HOSPADM

## 2025-06-11 RX ORDER — DIPHENHYDRAMINE HYDROCHLORIDE 50 MG/ML
12.5 INJECTION, SOLUTION INTRAMUSCULAR; INTRAVENOUS
Status: DISCONTINUED | OUTPATIENT
Start: 2025-06-11 | End: 2025-06-11 | Stop reason: HOSPADM

## 2025-06-11 RX ORDER — SCOPOLAMINE 1 MG/3D
1 PATCH, EXTENDED RELEASE TRANSDERMAL
Status: DISCONTINUED | OUTPATIENT
Start: 2025-06-11 | End: 2025-06-11 | Stop reason: HOSPADM

## 2025-06-11 RX ORDER — BUPIVACAINE HYDROCHLORIDE 7.5 MG/ML
INJECTION, SOLUTION EPIDURAL; RETROBULBAR
Status: COMPLETED | OUTPATIENT
Start: 2025-06-11 | End: 2025-06-11

## 2025-06-11 RX ORDER — ONDANSETRON 4 MG/1
4 TABLET, ORALLY DISINTEGRATING ORAL EVERY 8 HOURS PRN
Status: DISCONTINUED | OUTPATIENT
Start: 2025-06-11 | End: 2025-06-11 | Stop reason: HOSPADM

## 2025-06-11 RX ORDER — KETOROLAC TROMETHAMINE 30 MG/ML
30 INJECTION, SOLUTION INTRAMUSCULAR; INTRAVENOUS ONCE
Status: DISCONTINUED | OUTPATIENT
Start: 2025-06-11 | End: 2025-06-11 | Stop reason: HOSPADM

## 2025-06-11 RX ORDER — ROPIVACAINE HYDROCHLORIDE 5 MG/ML
INJECTION, SOLUTION EPIDURAL; INFILTRATION; PERINEURAL
Status: DISCONTINUED
Start: 2025-06-11 | End: 2025-06-11 | Stop reason: HOSPADM

## 2025-06-11 RX ORDER — MIDAZOLAM HYDROCHLORIDE 1 MG/ML
INJECTION, SOLUTION INTRAMUSCULAR; INTRAVENOUS
Status: COMPLETED
Start: 2025-06-11 | End: 2025-06-11

## 2025-06-11 RX ORDER — MIDAZOLAM HYDROCHLORIDE 1 MG/ML
1 INJECTION, SOLUTION INTRAMUSCULAR; INTRAVENOUS ONCE
Status: COMPLETED | OUTPATIENT
Start: 2025-06-11 | End: 2025-06-11

## 2025-06-11 RX ORDER — SENNOSIDES 8.6 MG
325 CAPSULE ORAL 2 TIMES DAILY
Status: CANCELLED | OUTPATIENT
Start: 2025-06-12

## 2025-06-11 RX ORDER — M-VIT,TX,IRON,MINS/CALC/FOLIC 27MG-0.4MG
1 TABLET ORAL DAILY
Status: CANCELLED | OUTPATIENT
Start: 2025-06-11

## 2025-06-11 RX ORDER — SODIUM CHLORIDE 9 MG/ML
INJECTION, SOLUTION INTRAVENOUS PRN
Status: CANCELLED | OUTPATIENT
Start: 2025-06-11

## 2025-06-11 RX ORDER — PHENYLEPHRINE HCL IN 0.9% NACL 1 MG/10 ML
SYRINGE (ML) INTRAVENOUS
Status: DISCONTINUED | OUTPATIENT
Start: 2025-06-11 | End: 2025-06-11 | Stop reason: SDUPTHER

## 2025-06-11 RX ORDER — SODIUM CHLORIDE 9 MG/ML
INJECTION, SOLUTION INTRAVENOUS
Status: DISCONTINUED | OUTPATIENT
Start: 2025-06-11 | End: 2025-06-11

## 2025-06-11 RX ORDER — ASPIRIN 81 MG/1
81 TABLET ORAL 2 TIMES DAILY
Qty: 60 TABLET | Refills: 0 | Status: SHIPPED | OUTPATIENT
Start: 2025-06-11

## 2025-06-11 RX ORDER — MORPHINE SULFATE 10 MG/ML
2 INJECTION, SOLUTION INTRAMUSCULAR; INTRAVENOUS
Refills: 0 | Status: CANCELLED | OUTPATIENT
Start: 2025-06-11

## 2025-06-11 RX ORDER — CETIRIZINE HYDROCHLORIDE 10 MG/1
10 TABLET ORAL DAILY
Status: CANCELLED | OUTPATIENT
Start: 2025-06-12

## 2025-06-11 RX ORDER — CELECOXIB 100 MG/1
200 CAPSULE ORAL ONCE
Status: COMPLETED | OUTPATIENT
Start: 2025-06-11 | End: 2025-06-11

## 2025-06-11 RX ORDER — FENTANYL CITRATE 50 UG/ML
50 INJECTION, SOLUTION INTRAMUSCULAR; INTRAVENOUS ONCE
Status: COMPLETED | OUTPATIENT
Start: 2025-06-11 | End: 2025-06-11

## 2025-06-11 RX ORDER — IPRATROPIUM BROMIDE AND ALBUTEROL SULFATE 2.5; .5 MG/3ML; MG/3ML
1 SOLUTION RESPIRATORY (INHALATION)
Status: DISCONTINUED | OUTPATIENT
Start: 2025-06-11 | End: 2025-06-11 | Stop reason: HOSPADM

## 2025-06-11 RX ORDER — NALOXONE HYDROCHLORIDE 0.4 MG/ML
INJECTION, SOLUTION INTRAMUSCULAR; INTRAVENOUS; SUBCUTANEOUS PRN
Status: DISCONTINUED | OUTPATIENT
Start: 2025-06-11 | End: 2025-06-11 | Stop reason: HOSPADM

## 2025-06-11 RX ORDER — FENTANYL CITRATE 50 UG/ML
INJECTION, SOLUTION INTRAMUSCULAR; INTRAVENOUS
Status: COMPLETED | OUTPATIENT
Start: 2025-06-11 | End: 2025-06-11

## 2025-06-11 RX ORDER — ONDANSETRON 2 MG/ML
INJECTION INTRAMUSCULAR; INTRAVENOUS
Status: DISCONTINUED | OUTPATIENT
Start: 2025-06-11 | End: 2025-06-11 | Stop reason: SDUPTHER

## 2025-06-11 RX ORDER — DEXAMETHASONE SODIUM PHOSPHATE 10 MG/ML
8 INJECTION, SOLUTION INTRAMUSCULAR; INTRAVENOUS ONCE
Status: COMPLETED | OUTPATIENT
Start: 2025-06-11 | End: 2025-06-11

## 2025-06-11 RX ORDER — ACETAMINOPHEN 325 MG/1
650 TABLET ORAL EVERY 6 HOURS
Status: CANCELLED | OUTPATIENT
Start: 2025-06-12

## 2025-06-11 RX ORDER — DEXAMETHASONE SODIUM PHOSPHATE 10 MG/ML
INJECTION, SOLUTION INTRAMUSCULAR; INTRAVENOUS
Status: COMPLETED | OUTPATIENT
Start: 2025-06-11 | End: 2025-06-11

## 2025-06-11 RX ORDER — VANCOMYCIN HYDROCHLORIDE 1 G/20ML
INJECTION, POWDER, LYOPHILIZED, FOR SOLUTION INTRAVENOUS PRN
Status: DISCONTINUED | OUTPATIENT
Start: 2025-06-11 | End: 2025-06-11 | Stop reason: ALTCHOICE

## 2025-06-11 RX ORDER — ONDANSETRON 2 MG/ML
4 INJECTION INTRAMUSCULAR; INTRAVENOUS EVERY 6 HOURS PRN
Status: DISCONTINUED | OUTPATIENT
Start: 2025-06-11 | End: 2025-06-11 | Stop reason: HOSPADM

## 2025-06-11 RX ORDER — SODIUM CHLORIDE, SODIUM LACTATE, POTASSIUM CHLORIDE, CALCIUM CHLORIDE 600; 310; 30; 20 MG/100ML; MG/100ML; MG/100ML; MG/100ML
INJECTION, SOLUTION INTRAVENOUS CONTINUOUS
Status: DISCONTINUED | OUTPATIENT
Start: 2025-06-11 | End: 2025-06-11 | Stop reason: HOSPADM

## 2025-06-11 RX ORDER — ALBUTEROL SULFATE 0.83 MG/ML
2.5 SOLUTION RESPIRATORY (INHALATION) EVERY 6 HOURS PRN
Status: CANCELLED | OUTPATIENT
Start: 2025-06-11

## 2025-06-11 RX ORDER — PROCHLORPERAZINE EDISYLATE 5 MG/ML
5 INJECTION INTRAMUSCULAR; INTRAVENOUS
Status: DISCONTINUED | OUTPATIENT
Start: 2025-06-11 | End: 2025-06-11 | Stop reason: HOSPADM

## 2025-06-11 RX ADMIN — DEXAMETHASONE SODIUM PHOSPHATE 8 MG: 10 INJECTION, SOLUTION INTRAMUSCULAR; INTRAVENOUS at 08:21

## 2025-06-11 RX ADMIN — GLYCOPYRROLATE 0.2 MG: 0.2 INJECTION INTRAMUSCULAR; INTRAVENOUS at 08:21

## 2025-06-11 RX ADMIN — Medication 200 MCG: at 09:05

## 2025-06-11 RX ADMIN — MIDAZOLAM 1 MG: 1 INJECTION INTRAMUSCULAR; INTRAVENOUS at 07:36

## 2025-06-11 RX ADMIN — BUPIVACAINE HYDROCHLORIDE 15 MG: 7.5 INJECTION, SOLUTION EPIDURAL; RETROBULBAR at 08:13

## 2025-06-11 RX ADMIN — ONDANSETRON 4 MG: 2 INJECTION, SOLUTION INTRAMUSCULAR; INTRAVENOUS at 09:36

## 2025-06-11 RX ADMIN — CEFAZOLIN SODIUM 2000 MG: 1 POWDER, FOR SOLUTION INTRAMUSCULAR; INTRAVENOUS at 08:18

## 2025-06-11 RX ADMIN — MIDAZOLAM HYDROCHLORIDE 1 MG: 1 INJECTION, SOLUTION INTRAMUSCULAR; INTRAVENOUS at 07:36

## 2025-06-11 RX ADMIN — FENTANYL CITRATE 50 MCG: 50 INJECTION INTRAMUSCULAR; INTRAVENOUS at 07:36

## 2025-06-11 RX ADMIN — Medication 200 MCG: at 08:40

## 2025-06-11 RX ADMIN — Medication 200 MCG: at 08:58

## 2025-06-11 RX ADMIN — Medication 200 MCG: at 08:24

## 2025-06-11 RX ADMIN — SODIUM CHLORIDE, SODIUM LACTATE, POTASSIUM CHLORIDE, AND CALCIUM CHLORIDE: .6; .31; .03; .02 INJECTION, SOLUTION INTRAVENOUS at 06:28

## 2025-06-11 RX ADMIN — FENTANYL CITRATE 50 MCG: 50 INJECTION, SOLUTION INTRAMUSCULAR; INTRAVENOUS at 07:36

## 2025-06-11 RX ADMIN — WATER 2000 MG: 1 INJECTION INTRAMUSCULAR; INTRAVENOUS; SUBCUTANEOUS at 11:19

## 2025-06-11 RX ADMIN — ACETAMINOPHEN 1000 MG: 500 TABLET ORAL at 06:28

## 2025-06-11 RX ADMIN — CELECOXIB 200 MG: 100 CAPSULE ORAL at 06:28

## 2025-06-11 RX ADMIN — PROPOFOL INJECTABLE EMULSION 70 MCG/KG/MIN: 10 INJECTION, EMULSION INTRAVENOUS at 08:18

## 2025-06-11 RX ADMIN — Medication 100 MCG: at 08:33

## 2025-06-11 RX ADMIN — DEXAMETHASONE SODIUM PHOSPHATE 10 MG: 10 INJECTION, SOLUTION INTRAMUSCULAR; INTRAVENOUS at 07:37

## 2025-06-11 RX ADMIN — ROPIVACAINE HYDROCHLORIDE 20 ML: 5 INJECTION, SOLUTION EPIDURAL; INFILTRATION; PERINEURAL at 07:37

## 2025-06-11 RX ADMIN — DEXAMETHASONE SODIUM PHOSPHATE 8 MG: 10 INJECTION, SOLUTION INTRAMUSCULAR; INTRAVENOUS at 07:58

## 2025-06-11 RX ADMIN — Medication 100 MCG: at 09:13

## 2025-06-11 RX ADMIN — FENTANYL CITRATE 25 MCG: 50 INJECTION, SOLUTION INTRAMUSCULAR; INTRAVENOUS at 08:13

## 2025-06-11 RX ADMIN — Medication 200 MCG: at 09:24

## 2025-06-11 RX ADMIN — Medication 200 MCG: at 09:40

## 2025-06-11 RX ADMIN — Medication 100 MCG: at 08:25

## 2025-06-11 RX ADMIN — PROPOFOL INJECTABLE EMULSION 100 MG: 10 INJECTION, EMULSION INTRAVENOUS at 08:17

## 2025-06-11 ASSESSMENT — PAIN SCALES - GENERAL
PAINLEVEL_OUTOF10: 0

## 2025-06-11 ASSESSMENT — PAIN - FUNCTIONAL ASSESSMENT
PAIN_FUNCTIONAL_ASSESSMENT: 0-10
PAIN_FUNCTIONAL_ASSESSMENT: 0-10

## 2025-06-11 NOTE — ANESTHESIA POSTPROCEDURE EVALUATION
Department of Anesthesiology  Postprocedure Note    Patient: Micheline Prince  MRN: [id number]  YOB: 1960  Date of evaluation: 6/11/2025    Procedure Summary       Date: 06/11/25 Room / Location: [address]    Anesthesia Start: 0803 Anesthesia Stop: 0959    Procedure: LEFT KNEE TOTAL KNEE ARTHROPLASTY-6/11/25 JOVAN ROBLES (Left: Knee) Diagnosis:       Left knee DJD      (Left knee DJD [M17.12])    Surgeons: Bob Mittal DO Responsible Provider: Zack Ventura DO    Anesthesia Type: MAC, Spinal ASA Status: 3            Anesthesia Type: MAC, Spinal    Rohini Phase I: Rohini Score: 9    Rohini Phase II: Rohini Score: 9    Anesthesia Post Evaluation    Patient location during evaluation: PACU  Patient participation: complete - patient participated  Level of consciousness: awake and alert  Pain score: 0  Airway patency: patent  Nausea & Vomiting: no nausea and no vomiting  Cardiovascular status: blood pressure returned to baseline and hemodynamically stable  Respiratory status: acceptable  Hydration status: stable  Pain management: adequate    No notable events documented.

## 2025-06-11 NOTE — OP NOTE
Operative Note      Patient: Micheline Prince  YOB: 1960  MRN: [id number]    Date of Procedure: 6/11/2025    Pre-Op Diagnosis Codes:      * Left knee DJD [M17.12]    Post-Op Diagnosis: Same       Procedure(s):  LEFT KNEE TOTAL KNEE ARTHROPLASTY-6/11/25 JOVAN ROBLES    Surgeon(s):  Bob Mittal DO    Assistant:   Resident: Earl Woodall DO; Jcarlos Morris DO; Leif Gonzalez DO    Anesthesia: Spinal    Estimated Blood Loss (mL): less than 100     Complications: None    Specimens:   ID Type Source Tests Collected by Time Destination   A : bone left knee Bone Bone SURGICAL PATHOLOGY Bob Mittal DO 6/11/2025 0909        Implants:  Implant Name Type Inv. Item Serial No.  Lot No. LRB No. Used Action   BASEPLATE TIB SZ 3 AP44MM ML67MM KNEE TRITANIUM 4 CRUCFRM - [id number]  BASEPLATE TIB SZ 3 AP44MM ML67MM KNEE TRITANIUM 4 CRUCFRM  [employer] [id number] Left 1 Implanted   INSERT TIB CS 3 10 MM ARTC POST KNEE BEAR TECHNOLOGY X3 - [id number]  INSERT TIB CS 3 10 MM ARTC POST KNEE BEAR TECHNOLOGY X3  [employer] [id number] Left 1 Implanted   COMPONENT FEM SZ 3 L KNEE CRUCE RET CEMENTLESS BEAD W/ ESPINOZA - [id number]  COMPONENT FEM SZ 3 L KNEE CRUCE RET CEMENTLESS BEAD W/ ESPINOZA  [employer] [id number] Left 1 Implanted   CEMENT BNE 40 GM RADIOPAQUE BA SIMPLEX P - [id number]  CEMENT BNE 40 GM RADIOPAQUE BA SIMPLEX P  [employer] [id number] Left 1 Implanted   IMPLANT PAT [id number] THK8MM X3 SYMMETRIC TRIATHLON - [id number]  IMPLANT PAT [id number] THK8MM X3 SYMMETRIC TRIATHLON  [employer]WD [id number] Left 1 Implanted         Drains: * No LDAs found *    Findings:  Infection Present At Time Of Surgery (PATOS) (choose all levels that have infection present):  No infection present  Other Findings: as above    Detailed Description of Procedure:   below      Brief Hospital Course:  Micheline Prince is a patient known to Vania Mckeon  injected into the knee joint. Copious irrigation was performed of this layer followed by platelet-poor plasma gel injected in the subcutaneous tissues, 2-0 Vicryl for the subcutaneous tissues, and Monocryl  for the skin. A sterile dressing was placed over the wound. Tourniquet was deflated. The patient was awakened from anesthesia, transferred to the hospital bed, and taken to the PACU in stable condition.    Disposition: The patient was taken to PACU in stable condition. Once stable, the patient will be transferred to the floor. Orders have been provided to begin physical therapy, weight bear as tolerated Left lower extremity. Patient received a dose of ancef preoperatively. We will continue this for 24 hours postoperatively for infection prophylaxis. The patient will also be started on asa for DVT prophylaxis. We have consulted  and case management for discharge planning and consulted the PCP for medical management.  It should be noted that Dr Mittal was present for the entirety of the procedure.              Electronically signed by Bob Mittal DO on 6/11/2025 at 9:36 AM

## 2025-06-11 NOTE — PROGRESS NOTES
OCCUPATIONAL THERAPY INITIAL EVALUATION    [facility]  [address] [place]        Date:2025                                                  Patient Name: Micheline Prince    MRN: [id number]    : 1960    Room: OR POOL/NONE      Evaluating OT: Biju Fagan OTR/L; #[id number]     Referring Provider and Specific Provider Orders/Date:      25 1230  OT eval and treat  Start:  25 1230,   End:  25 1230,   ONE TIME,   Standing Count:  1 Occurrences,   R         Bob Mittal, DO      Placement Recommendation: Home       Diagnosis:   1. Primary osteoarthritis of left knee    2. Pre-op evaluation    3. Left knee DJD         Surgery: LEFT KNEE TOTAL KNEE ARTHROPLASTY-25 JOVAN MAKO       Pertinent Medical History:       Past Medical History:   Diagnosis Date    Apnea     Arthritis     Asthma     COPD (chronic obstructive pulmonary disease) (HCC)     History of cardiovascular stress test 2017    lexiscan stress test    Hypertension     Pneumonia 3/29/2014         Past Surgical History:   Procedure Laterality Date    BUNIONECTOMY      both feet    HERNIA REPAIR      TUBAL LIGATION      URETER REVISION          Precautions:  Fall Risk, Full WBAT,      Assessment of current deficits:     [x] Functional mobility  [x]ADLs  [x] Strength               []Cognition    [x] Functional transfers   [x] IADLs         [] Safety Awareness   [x]Endurance    [] Fine Coordination              [x] Balance      [] Vision/perception   []Sensation     []Gross Motor Coordination  [] ROM  [] Delirium                   [] Motor Control     OT PLAN OF CARE   OT POC based on physician orders, patient diagnosis and results of clinical assessment    Frequency/Duration 1-3 days/wk for 2 weeks PRN     Specific OT Treatment Interventions to include:   * Instruction/training on adapted ADL techniques and AE recommendations to increase functional independence    Orthotic Management [id number]       Manual [id number]     Neuro Re-Ed [id number]       Non-Billable Time        Evaluation Time additionally includes thorough review of current medical information, gathering information on past medical history/social history and prior level of function, interpretation of standardized testing/informal observation of tasks, assessment of data and development of plan of care and goals.        Evaluating OT: Biju Fagan OTR/L; #[id number]

## 2025-06-11 NOTE — CARE COORDINATION
6/11/2025: SS NOTE:  SS Consult for post-op d/c planning and HHC order noted, pt had surgery today for a total knee arthroplasty, met with pt in [facility], pt rosie&o, kemar, reports no past HHC or agency preference, [facility] tomorrow 6/12, pt given w/w from [facility], declined bsc or ttb, will sponge bathe and has a high toilet and her daughter will be staying with her to help her as needed for 2 weeks and will transport her home, [facility] notified.Electronically signed by EMELY Hart on 6/11/2025 at 12:48 PM

## 2025-06-11 NOTE — ANESTHESIA PROCEDURE NOTES
Spinal Block    Patient location during procedure: OB  End time: 6/11/2025 8:13 AM  Reason for block: primary anesthetic and at surgeon's request  Staffing  Performed: other anesthesia staff   Anesthesiologist: Zack Ventura DO  Resident/CRNA: Ralf Zuñiga APRN - CRNA  Other anesthesia staff: Nicolasa Antonio RN  Performed by: Ralf Zuñiga APRN - CRNA  Authorized by: Zack Ventura DO    Spinal Block  Patient position: sitting  Prep: Betadine  Patient monitoring: cardiac monitor, continuous pulse ox, continuous capnometry and frequent blood pressure checks  Approach: midline  Location: L3/L4  Provider prep: mask, sterile gloves and sterile gown  Needle  Needle type: Pencan   Needle gauge: 25 G  Needle length: 3.5 in  Assessment  Sensory level: T6  Swirl obtained: Yes  CSF: clear  Attempts: 1  Hemodynamics: stable  Preanesthetic Checklist  Completed: patient identified, IV checked, site marked, risks and benefits discussed, surgical/procedural consents, equipment checked, pre-op evaluation, timeout performed, anesthesia consent given, oxygen available, monitors applied/VS acknowledged, fire risk safety assessment completed and verbalized and blood product R/B/A discussed and consented

## 2025-06-11 NOTE — ANESTHESIA PROCEDURE NOTES
Peripheral Block    Patient location during procedure: holding area  Reason for block: at surgeon's request  Start time: 6/11/2025 7:37 AM  End time: 6/11/2025 7:42 AM  Staffing  Performed: anesthesiologist   Anesthesiologist: Danyel Ruff MD  Performed by: Zack Ventura DO  Authorized by: Zack Ventura DO    Preanesthetic Checklist  Completed: patient identified, IV checked, site marked, risks and benefits discussed, surgical/procedural consents, equipment checked, pre-op evaluation, timeout performed, anesthesia consent given, oxygen available, monitors applied/VS acknowledged, fire risk safety assessment completed and verbalized and blood product R/B/A discussed and consented  Peripheral Block   Patient position: supine  Prep: ChloraPrep  Provider prep: mask and sterile gloves  Patient monitoring: cardiac monitor, continuous pulse ox, frequent blood pressure checks, IV access, oxygen and responsive to questions  Block type: Femoral  Adductor canal  Laterality: left  Injection technique: single-shot  Guidance: ultrasound guided  Local infiltration: lidocaine  Infiltration strength: 1 %  Local infiltration: lidocaine  Dose: 3 mL    Needle   Needle type: insulated echogenic nerve stimulator needle   Needle gauge: 20 G  Needle localization: ultrasound guidance  Needle length: 10 cm  Assessment   Injection assessment: negative aspiration for heme, no paresthesia on injection, local visualized surrounding nerve on ultrasound and no intravascular symptoms  Paresthesia pain: none  Slow fractionated injection: yes  Hemodynamics: stable  Outcomes: uncomplicated and patient tolerated procedure well    Medications Administered  ropivacaine (NAROPIN) injection 0.5% - Perineural, Thigh Left   20 mL - 6/11/2025 7:37:00 AM  dexAMETHasone (DECADRON) (PF) 10 mg/mL injection - Other, Thigh Left   10 mg - 6/11/2025 7:37:00 AM

## 2025-06-11 NOTE — PROGRESS NOTES
CLINICAL PHARMACY NOTE: MEDS TO BEDS    Total # of Prescriptions Filled: 4   The following medications were delivered to the patient:  Percocet  mg  Aspirin 81 mg  Cephalexin 500 mg  Gabapentin 100 mg    Additional Documentation:

## 2025-06-11 NOTE — PROGRESS NOTES
Physical Therapy Initial Evaluation/Plan of Care    Room #:  OR POOL/NONE  Patient Name: Micheline Prince  YOB: 1960  MRN: [id number]    Date of Service: 6/11/2025     Tentative placement recommendation: Home with Home Health Physical Therapy   Equipment recommendation: Patient has needed equipment       Evaluating Physical Therapist: PRIYANKA Centeno     Specific Provider Orders/Date/Referring Provider :PT eval and treat  Start:  06/11/25 1230,   End:  06/11/25 1230,   ONE TIME,   Standing Count:  1 Occurrences,   R       Bob Mittal DO     Admitting Diagnosis:   Left knee DJD [M17.12]   Visit diagnosis:   Visit Diagnoses         Codes      Pre-op evaluation     Z01.818            Patient Active Problem List   Diagnosis    Arthritis    COPD (chronic obstructive pulmonary disease) (HCC)    Hypertension    Asthma    Apnea    Pneumonia    Hypoxia    Acute respiratory failure with hypoxia (HCC)    Acute respiratory failure (HCC)    Chest pain    COPD with acute exacerbation (HCC)    TOMMY (obstructive sleep apnea)    BMI 33.0-33.9,adult    Left knee DJD        ASSESSMENT of Current Deficits  Safe for discharge home with family at a Supervision  level. Patient will continue to need therapy to maximize function.  Daughter received hands on training for ambulation and stairs.  All questions and concerns addressed.     PHYSICAL THERAPY  PLAN OF CARE       Patient and or family understand(s) diagnosis, prognosis, and plan of care.       Prior Level of Function: Patient ambulated independently    Rehab Potential: good for baseline      Past medical history:   Past Medical History:   Diagnosis Date    Apnea     Arthritis     Asthma     COPD (chronic obstructive pulmonary disease) (HCC)     History of cardiovascular stress test 12/13/2017    lexiscan stress test    Hypertension     Pneumonia 3/29/2014     Past Surgical History:   Procedure Laterality Date    BUNIONECTOMY      both feet    HERNIA REPAIR       TUBAL LIGATION      URETER REVISION           SUBJECTIVE:    Precautions:  ambulate patient,  falls , left knee  WBAT (weight bearing as tolerated)      Social history: Patient lives  alone; single family home, One story with basement and attic, 4 steps to enter with rail, tub shower; Daughter will be staying with patient   Equipment owned: wheeled walker and cane    AM-PAC Basic Mobility       AM-PAC Basic Mobility - Inpatient   How much help is needed turning from your back to your side while in a flat bed without using bedrails?: A Little  How much help is needed moving from lying on your back to sitting on the side of a flat bed without using bedrails?: A Little  How much help is needed moving to and from a bed to a chair?: A Little  How much help is needed standing up from a chair using your arms?: A Little  How much help is needed walking in hospital room?: A Little  How much help is needed climbing 3-5 steps with a railing?: A Little  AM-[facility] Inpatient Mobility Raw Score : 18  AM-PAC Inpatient T-Scale Score : 43.63  Mobility Inpatient CMS 0-100% Score: 46.58  Mobility Inpatient CMS G-Code Modifier : CK    Nursing cleared patient for PT evaluation. The admitting diagnosis and active problem list as listed above have been reviewed prior to the initiation of this evaluation.        OBJECTIVE:   Initial Evaluation  Date: 6/11/2025   Was pt agreeable to Eval/treatment? Yes   Pain Level  4/10   Left knee   Bed Mobility  Rolling: Supervision     Supine to sit: Supervision     Sit to supine: Supervision     Scooting: Supervision      Transfers Sit to stand: Minimal progressing to supervision  wheel walker  Verbal cueing for hand and foot placement   Ambulation     2x50, 1x25 feet using  wheeled walker with Minimal progressing to supervision   cues for sequencing, walker approximation, safety, pacing, and pursed lip breathing   Stair negotiation: ascended and descended   2 x 5 steps, with rail minAx1 progressing to

## 2025-06-11 NOTE — H&P
Updated H&P    Chief Complaint   Patient presents with    Knee Pain       Left knee pain. LOV 2/25/25 with B/L knee CSI. Patient presents today with continued complaints of constant left knee pain and frequent episodes of popping out.          Subjective:     Patient ID: Micheline Prince is a 64 y.o..  female     Knee Pain  Patient presented for follow up of left knee pain. She stated that she had injections done with minimal relief. She c/o popping and increased pain when ambulating. The patient has failed all attempts at conservative treatment including: cortisone injection, visco injections, zilretta injections, physician directed HEP, nsaids, pain medication, OTC medication, ice, heat, use of assistive device and activity restriction.        Past Medical History        Past Medical History:   Diagnosis Date    Apnea      Arthritis      Asthma      COPD (chronic obstructive pulmonary disease) ([facility])      History of cardiovascular stress test 12/13/2017     lexiscan stress test    Hypertension      Pneumonia 3/29/2014         Past Surgical History         Past Surgical History:   Procedure Laterality Date    BUNIONECTOMY         both feet    HERNIA REPAIR        TUBAL LIGATION        URETER REVISION               Current Medication      Current Outpatient Medications:     OZEMPIC, 0.25 OR 0.5 MG/DOSE, 2 MG/3ML SOPN, INJECT 0.5 MG UNDER THE SKIN ONCE PER WEEK, Disp: , Rfl:     buPROPion (WELLBUTRIN SR) 150 MG extended release tablet, take 1 tablet by mouth twice a day, Disp: , Rfl:     hydrOXYzine pamoate (VISTARIL) 50 MG capsule, take 1 capsule by mouth four times a day, Disp: , Rfl:     omeprazole (PRILOSEC) 40 MG delayed release capsule, take 1 capsule by mouth once daily, Disp: , Rfl:     gabapentin (NEURONTIN) 300 MG capsule, take 1 capsule by mouth twice a day, Disp: , Rfl:     tiotropium (SPIRIVA RESPIMAT) 2.5 MCG/ACT AERS inhaler, Inhale 2 puffs into the lungs, Disp: , Rfl:     cloNIDine    Musculoskeletal:  Gait: antalgic; examination of the nails and digits reveal no cyanosis or clubbing.     Lumbar exam:  On visual inspection, there is not deformity of the spine.   full range of motion, no tenderness, palpable spasm or pain on motion. Special tests: Straight Leg Raise negative, Dave test negative.        Hip exam:   Upon inspection, there is not deformity noted.  Upon palpation there is not tenderness.  ROM: is  full and symmetrical.   Strength: Hip Flexors 5/5; Hip Abductors 5/5; Hip Adduction 5/5.      Knee exam:  Bilateral knee exam shows;  range of motion of R. Knee is 0 to 120, and L. Knee is 0 to 120. The patient does have  pain on motion, effusion is mild, there is tenderness over the  medial, lateral, anterior region, there are not any masses, there is not ligamentous instability, there is not  deformity noted.    Knee exam: bilateral positive for moderate crepitations, some mild tenderness laxity is not noted with stress.  There is not a popliteal cyst.     R. Knee:  Lachman's negative, Anterior Drawer negative, Posterior Drawer negative  Camilla's negative, Thallasy  negative,   PF grind test negative, Apprehension test negative, Patellar J sign  negative  L. Knee:  Lachman's negative, Anterior Drawer negative, Posterior Drawer negative  Camilla's negative, Thallasy  negative,   PF grind test negative, Apprehension test negative,  Patellar J sign  negative     Xray Exam:  Severe tricompartmental djd b/l knees  Radiographic findings reviewed with patient     Assessment:       Encounter Diagnoses   Name Primary?    Primary osteoarthritis of left knee Yes            Plan:  Natural history and expected course discussed. Questions answered.  Educational materials distributed.  Rest, ice, compression, and elevation (RICE) therapy.  Reduction in offending activity.  Patellar compression sleeve.  OTC analgesics as needed.        I had a lengthy discussion with the patient regarding their

## 2025-06-11 NOTE — PROGRESS NOTES
0729-Patient brought to PACu, connected to monitors. Consents signed and verified. Bp 148/66, heart rate 72, SpO2 98% on room air. O2 applied for procedure  0735-timeout performed with Dr. Ventura for Left adductor canal block, all agree  0736-Patient premedicated for procedure-see MAR  0737-0743-Left adductor canal block completed using ultrasound guidance. Patient tolerated well  0746-Bp 138/72, heart rate 67 SpO2 100%. Reseting comfortably in bed

## 2025-06-11 NOTE — DISCHARGE INSTRUCTIONS
[facility] Department of Orthopedics  [address]   [place] [address]    MD Abdifatah Ribeiro DO K Brian Williams, DO    Orthopaedics Discharge Instructions   WBAT on left lower extremity  Pain medication Per Prescriptions  Contact Office for Medication Refill- [phone]  Office can refill pain med every 7 days  If patient discharging to facility then pain control will be continued per facility physician  Ice to operative/injured site for 15-30 minutes of each hour for next 5 days    Recommend that you continue to ice the area 2-3 times per day after this   Elevate operative/injured limb on 2 pillows at home  Goal is to have limb above the heart if able  Wound care -dressings remain clean dry intact.  On postoperative day 7 can be removed and surgical site can be clean with soapy water.  DVT prophylaxis, ASA 81 mg BID.      Follow Up in Office in 2 weeks.       Call the office at [phone] for directions or with any questions.  Watch for these significant complications.  Call physician if they or any other problems occur:  Fever over 101°, redness, swelling or warmth at the operative site  Unrelieved nausea    Foul smelling or cloudy drainage at the operative site   Unrelieved pain    Blood soaked dressing. (Some oozing may be normal)     Numb, pale, blue, cold or tingling extremity

## 2025-06-18 LAB — SURGICAL PATHOLOGY REPORT: NORMAL

## 2025-06-19 DIAGNOSIS — M17.12 PRIMARY OSTEOARTHRITIS OF LEFT KNEE: ICD-10-CM

## 2025-06-19 RX ORDER — OXYCODONE AND ACETAMINOPHEN 10; 325 MG/1; MG/1
1 TABLET ORAL EVERY 6 HOURS PRN
Qty: 28 TABLET | Refills: 0 | Status: SHIPPED | OUTPATIENT
Start: 2025-06-19 | End: 2025-06-26

## 2025-06-19 NOTE — TELEPHONE ENCOUNTER
.Last appointment 5/5/2025  Next appointment   Future Appointments   Date Time Provider Department Center   6/26/2025  9:00 AM Bob Mittal DO [facility]      Last refill 06/11/2025  DOS: 06/11/2025      Patient called in requesting refill of :        oxyCODONE-acetaminophen (PERCOCET)  MG per tablet      [id number] - [place], [place] - [address] - P [phone] - F [phone]    [Negative] : Heme/Lymph [de-identified] : Back pain [de-identified] : Migraines

## 2025-06-20 DIAGNOSIS — M17.12 PRIMARY OSTEOARTHRITIS OF LEFT KNEE: Primary | ICD-10-CM

## 2025-06-26 ENCOUNTER — OFFICE VISIT (OUTPATIENT)
Dept: ORTHOPEDIC SURGERY | Age: 65
End: 2025-06-26

## 2025-06-26 DIAGNOSIS — Z96.652 S/P TOTAL KNEE ARTHROPLASTY, LEFT: ICD-10-CM

## 2025-06-26 DIAGNOSIS — M17.12 PRIMARY OSTEOARTHRITIS OF LEFT KNEE: Primary | ICD-10-CM

## 2025-06-26 PROCEDURE — 99024 POSTOP FOLLOW-UP VISIT: CPT | Performed by: ORTHOPAEDIC SURGERY

## 2025-06-26 RX ORDER — OXYCODONE AND ACETAMINOPHEN 10; 325 MG/1; MG/1
1 TABLET ORAL EVERY 6 HOURS PRN
Qty: 28 TABLET | Refills: 0 | Status: SHIPPED | OUTPATIENT
Start: 2025-06-26 | End: 2025-07-03

## 2025-06-26 NOTE — PROGRESS NOTES
Subjective:     Post-Operative week: 2 Status Post left Total Knee Arthroplasty, surgery date 6/11/25. Systemic or Specific Complaints:none    Objective:     General: alert, appears stated age and cooperative   Wound: Wound clean and dry no evidence of infection., No Erythema, No Edema and No Drainage   Motion: Flexion: 0 to 105 Degrees   DVT Exam: No evidence of DVT seen on physical exam.  Negative Daniel's sign.  No cords or calf tenderness.  No significant calf/ankle edema.     Imaging:  Xrays:  No signs of fracture, there is good alignment, and no signs of aseptic loosening.   Radiographic findings reviewed with patient    Assessment:     Encounter Diagnoses   Name Primary?    Primary osteoarthritis of left knee Yes    S/P total knee arthroplasty, left       Doing well postoperatively.     Plan:     Continues current post-op course, staples were removed at today's appointment  Patient is to continue taking enteric coated aspirin 81 mg, 1 tablet twice daily.    Patient is to continue wearing CAROLINA hose, on during the day and off at night.    Outpatient physical therapy is to begin immediately.    No bathing/swimming/hot tub for two weeks or until incision is completely closed.    We will see the patient back in 4 weeks for repeat xray and evaluation.  Please call office with any questions or concerns at [phone]

## 2025-06-30 ENCOUNTER — EVALUATION (OUTPATIENT)
Dept: PHYSICAL THERAPY | Age: 65
End: 2025-06-30
Payer: MEDICARE

## 2025-06-30 DIAGNOSIS — M17.12 PRIMARY OSTEOARTHRITIS OF LEFT KNEE: Primary | ICD-10-CM

## 2025-06-30 DIAGNOSIS — Z96.652 S/P TOTAL KNEE ARTHROPLASTY, LEFT: ICD-10-CM

## 2025-06-30 PROCEDURE — 97163 PT EVAL HIGH COMPLEX 45 MIN: CPT | Performed by: PHYSICAL THERAPIST

## 2025-06-30 NOTE — PROGRESS NOTES
[facility] OUTPATIENT REHABILITATION  PHYSICAL THERAPY INITIAL EVALUATION         Date:  2025   Patient: Micheline Prince  : 1960  MRN: [id number]  Referring Provider: [place] [address]     Medical Diagnosis:      Diagnosis Orders   1. Primary osteoarthritis of left knee        2. S/P total knee arthroplasty, left            Physician Order: Eval and Treat     SUBJECTIVE:     Surgical procedure: L TKA    Date of surgery: 2025    Services provided following surgery: home care    History: TKA due to DJD.    Chief complaint: pain, edema, decreased motion, decreased mobility, weakness, inability / limited ability to use leg, difficulty walking, difficulty with stairs, limited ability to complete ADLs (dressing , bathing, walking), limited ability to complete IADLs (cooking, cleaning, transportation , laundry), limited ability to lift/carry/handle material, limited ability to complete home/outdoor chores/tasks    Pain:   Current: 7/10         Imaging results: XR KNEE LEFT (1-2 VIEWS)  Result Date: 2025  EXAMINATION: TWO XRAY VIEWS OF THE LEFT KNEE 2025 9:31 am COMPARISON: 2025 HISTORY: ORDERING SYSTEM PROVIDED HISTORY: Primary osteoarthritis of left knee FINDINGS: There is a metallic prosthesis in the femoral condyle and tibial plateau which are unchanged position.  No fracture or dislocation is seen.  The bones are osteopenic.  There are postop changes along the patella.  There are skin clips anteriorly with  moderate edema throughout the soft tissues which is more prominent.  There is a moderate suprapatellar effusion.  There is a small amount of subcutaneous air in the joint which is less prominent     Total left knee replacement and diffuse osteopenia which is unchanged with no acute bony abnormality. Soft tissue swelling and edema anterior to the knee with extending into the joint space with which is more

## 2025-06-30 NOTE — PROGRESS NOTES
Physical Therapy Daily Treatment Note    Date: 2025  Patient Name: Micheline Prince  : 1960   MRN: [id number]  DOInjury: --  DOSx: 2025  Referring Provider: [place] [address]     Medical Diagnosis:      Diagnosis Orders   1. Primary osteoarthritis of left knee        2. S/P total knee arthroplasty, left          Patient is 3 weeks post total knee arthroplasty .  Patient has moderate limitations in ROM, moderate edema, and impaired strength, function, gait, weightbearing tolerance.  Treatment will start with edema control techniques, PROM, AAROM, AROM, stretching.  Progression to strengthening , functional training, gait device advancement, balance exercise  , proprioception exercise, will occur as quickly as tolerated.      Outcome Measure:   Lower Extremity Functional Scale (LEFS) 59% impairment      Access Code: [email or web address]  URL: [email or web address]  Date: 2025  Prepared by: Doe Mccoy    Program Notes  Proper Elevation-- Elevate limb on a stable stack of blankets / pillows so leg is higher than heart.-- Do this for 45 minutes, 2-3 times a day-- You may apply ice for the first 15 minutes, then remove it while you continue your elevation-- Be sure to lie flat in bed or on a couch with a comfortable pillow under your head.  Lying back in a recliner is not helpful.-- You may prop your head up if you cannot tolerate lying completely flat, but only as high as necessary for comfort.    Exercises  - Supine Heel Slide (Mirrored)  - 2 x daily - 3 sets - 10 reps  - Supine Quad Set  - 2 x daily - 3 sets - 10 reps - 5 sec hold  - Supine Straight Leg Raises  - 2 x daily - 3 sets - 10 reps  - Heel Prop  - 3 x daily - 1 sets - 2-3 min hold  - Seated Knee Flexion Stretch (Mirrored)  - 3 x daily - 3 reps - 30 sec hold    X = TO BE PERFORMED NEXT VISIT  > = PROGRESS TO THIS FIRST  >> = PROGRESS TO THIS SECOND  >>> = PROGRESS TO THIS THIRD    S: See eval  O:

## 2025-07-02 ENCOUNTER — TREATMENT (OUTPATIENT)
Dept: PHYSICAL THERAPY | Age: 65
End: 2025-07-02
Payer: MEDICARE

## 2025-07-02 DIAGNOSIS — M17.12 PRIMARY OSTEOARTHRITIS OF LEFT KNEE: Primary | ICD-10-CM

## 2025-07-02 DIAGNOSIS — Z96.652 S/P TOTAL KNEE ARTHROPLASTY, LEFT: ICD-10-CM

## 2025-07-02 PROCEDURE — 97110 THERAPEUTIC EXERCISES: CPT

## 2025-07-02 NOTE — PROGRESS NOTES
Physical Therapy Daily Treatment Note    Date: 2025  Patient Name: Micheline Prince  : 1960   MRN: [id number]  DOInjury: --  DOSx: 2025  Referring Provider: [place] [address]     Medical Diagnosis:      Diagnosis Orders   1. Primary osteoarthritis of left knee        2. S/P total knee arthroplasty, left          Patient is 3 weeks post total knee arthroplasty .  Patient has moderate limitations in ROM, moderate edema, and impaired strength, function, gait, weightbearing tolerance.  Treatment will start with edema control techniques, PROM, AAROM, AROM, stretching.  Progression to strengthening , functional training, gait device advancement, balance exercise  , proprioception exercise, will occur as quickly as tolerated.      Outcome Measure:   Lower Extremity Functional Scale (LEFS) 59% impairment      Access Code: [email or web address]  URL: [email or web address]  Date: 2025  Prepared by: Doe Mccoy    Program Notes  Proper Elevation-- Elevate limb on a stable stack of blankets / pillows so leg is higher than heart.-- Do this for 45 minutes, 2-3 times a day-- You may apply ice for the first 15 minutes, then remove it while you continue your elevation-- Be sure to lie flat in bed or on a couch with a comfortable pillow under your head.  Lying back in a recliner is not helpful.-- You may prop your head up if you cannot tolerate lying completely flat, but only as high as necessary for comfort.    Exercises  - Supine Heel Slide (Mirrored)  - 2 x daily - 3 sets - 10 reps  - Supine Quad Set  - 2 x daily - 3 sets - 10 reps - 5 sec hold  - Supine Straight Leg Raises  - 2 x daily - 3 sets - 10 reps  - Heel Prop  - 3 x daily - 1 sets - 2-3 min hold  - Seated Knee Flexion Stretch (Mirrored)  - 3 x daily - 3 reps - 30 sec hold    X = TO BE PERFORMED NEXT VISIT  > = PROGRESS TO THIS FIRST  >> = PROGRESS TO THIS SECOND  >>> = PROGRESS TO THIS THIRD    S: pt reports no

## 2025-07-07 DIAGNOSIS — Z96.652 S/P TOTAL KNEE ARTHROPLASTY, LEFT: ICD-10-CM

## 2025-07-07 DIAGNOSIS — M17.12 PRIMARY OSTEOARTHRITIS OF LEFT KNEE: ICD-10-CM

## 2025-07-07 RX ORDER — OXYCODONE AND ACETAMINOPHEN 10; 325 MG/1; MG/1
1 TABLET ORAL EVERY 6 HOURS PRN
Qty: 28 TABLET | Refills: 0 | Status: SHIPPED | OUTPATIENT
Start: 2025-07-07 | End: 2025-07-14

## 2025-07-07 NOTE — TELEPHONE ENCOUNTER
.Last appointment 6/26/2025  Next appointment   Future Appointments   Date Time Provider Department Center   7/10/2025  4:30 PM Andrew Forbes PTA [facility]   7/16/2025 10:00 AM Andrew Forbes PTA [facility]   7/18/2025  3:30 PM Andrew Forbes PTA [facility]   7/24/2025  1:15 PM Bella Galicia, LAYNE [facility]      Last refill 06/26/2025  DOS: 06/11/2025      Patient called in requesting refill of :    oxyCODONE-acetaminophen (PERCOCET)  MG per tablet      [id number] - [place], [place] - [address] - P [phone] - F [phone]

## 2025-07-10 ENCOUNTER — TREATMENT (OUTPATIENT)
Dept: PHYSICAL THERAPY | Age: 65
End: 2025-07-10
Payer: MEDICARE

## 2025-07-10 DIAGNOSIS — Z96.652 S/P TOTAL KNEE ARTHROPLASTY, LEFT: ICD-10-CM

## 2025-07-10 DIAGNOSIS — M17.12 PRIMARY OSTEOARTHRITIS OF LEFT KNEE: Primary | ICD-10-CM

## 2025-07-10 PROCEDURE — 97110 THERAPEUTIC EXERCISES: CPT

## 2025-07-10 NOTE — PROGRESS NOTES
Physical Therapy Daily Treatment Note    Date: 2025  Patient Name: Micheline Prince  : 1960   MRN: [id number]  DOInjury: --  DOSx: 2025  Referring Provider: [place] [address]     Medical Diagnosis:      Diagnosis Orders   1. Primary osteoarthritis of left knee        2. S/P total knee arthroplasty, left            Patient is 3 weeks post total knee arthroplasty .  Patient has moderate limitations in ROM, moderate edema, and impaired strength, function, gait, weightbearing tolerance.  Treatment will start with edema control techniques, PROM, AAROM, AROM, stretching.  Progression to strengthening , functional training, gait device advancement, balance exercise  , proprioception exercise, will occur as quickly as tolerated.      Outcome Measure:   Lower Extremity Functional Scale (LEFS) 59% impairment      Access Code: [email or web address]  URL: [email or web address]  Date: 2025  Prepared by: Doe Mccoy    Program Notes  Proper Elevation-- Elevate limb on a stable stack of blankets / pillows so leg is higher than heart.-- Do this for 45 minutes, 2-3 times a day-- You may apply ice for the first 15 minutes, then remove it while you continue your elevation-- Be sure to lie flat in bed or on a couch with a comfortable pillow under your head.  Lying back in a recliner is not helpful.-- You may prop your head up if you cannot tolerate lying completely flat, but only as high as necessary for comfort.    Exercises  - Supine Heel Slide (Mirrored)  - 2 x daily - 3 sets - 10 reps  - Supine Quad Set  - 2 x daily - 3 sets - 10 reps - 5 sec hold  - Supine Straight Leg Raises  - 2 x daily - 3 sets - 10 reps  - Heel Prop  - 3 x daily - 1 sets - 2-3 min hold  - Seated Knee Flexion Stretch (Mirrored)  - 3 x daily - 3 reps - 30 sec hold    X = TO BE PERFORMED NEXT VISIT  > = PROGRESS TO THIS FIRST  >> = PROGRESS TO THIS SECOND  >>> = PROGRESS TO THIS THIRD    S: pt reports

## 2025-07-16 ENCOUNTER — TELEPHONE (OUTPATIENT)
Dept: PHYSICAL THERAPY | Age: 65
End: 2025-07-16

## 2025-07-18 DIAGNOSIS — M17.12 PRIMARY OSTEOARTHRITIS OF LEFT KNEE: ICD-10-CM

## 2025-07-18 DIAGNOSIS — Z96.652 S/P TOTAL KNEE ARTHROPLASTY, LEFT: ICD-10-CM

## 2025-07-18 RX ORDER — OXYCODONE AND ACETAMINOPHEN 10; 325 MG/1; MG/1
1 TABLET ORAL EVERY 6 HOURS PRN
Qty: 28 TABLET | Refills: 0 | Status: SHIPPED | OUTPATIENT
Start: 2025-07-18 | End: 2025-07-25

## 2025-07-18 NOTE — TELEPHONE ENCOUNTER
.Last appointment 6/26/2025  Next appointment   Future Appointments   Date Time Provider Department Center   7/18/2025  3:30 PM Andrew Forbes PTA [facility]   7/21/2025  1:30 PM Alla Faith PTA [facility]   7/23/2025  4:30 PM Andrew Forbes PTA [facility]   7/24/2025  1:15 PM [facility]      Last refill 07/07/2025  DOS: 06/11/2025      Patient called in requesting refill of :    oxyCODONE-acetaminophen (PERCOCET)  MG per tablet     [facility] #[id number] - [place], [place] - [address] - P [phone] - F [phone]

## 2025-07-24 ENCOUNTER — OFFICE VISIT (OUTPATIENT)
Dept: ORTHOPEDIC SURGERY | Age: 65
End: 2025-07-24

## 2025-07-24 VITALS — BODY MASS INDEX: 27.49 KG/M2 | WEIGHT: 161 LBS | HEIGHT: 64 IN

## 2025-07-24 DIAGNOSIS — Z96.652 S/P TOTAL KNEE ARTHROPLASTY, LEFT: Primary | ICD-10-CM

## 2025-07-24 PROCEDURE — 99024 POSTOP FOLLOW-UP VISIT: CPT

## 2025-07-24 RX ORDER — OXYCODONE AND ACETAMINOPHEN 10; 325 MG/1; MG/1
1 TABLET ORAL EVERY 8 HOURS PRN
Qty: 21 TABLET | Refills: 0 | Status: SHIPPED | OUTPATIENT
Start: 2025-07-24 | End: 2025-07-31

## 2025-07-24 NOTE — PROGRESS NOTES
Post-Operative week: 6 Status Post left Total Knee Arthroplasty, DOS: 6/11/25  Systemic or Specific Complaints:No Complaints. She is doing well. She complains of some popping at the top of the knee but no pain. She takes pain medication as needed. She is still in PT and ambulating with cane.    Objective:     General: alert, appears stated age and cooperative   Wound: Wound clean and dry no evidence of infection., No Erythema, No Edema and No Drainage   Motion: Flexion: 0 to 115 Degrees   DVT Exam: No evidence of DVT seen on physical exam.  Negative Daniel's sign.  No cords or calf tenderness.  No significant calf/ankle edema.       Xrays:  No signs of fracture, there is good alignment, and no signs of aseptic loosening.       Assessment:     Encounter Diagnosis   Name Primary?    S/P total knee arthroplasty, left Yes      Doing well postoperatively.     Plan:     Continues current post-op course  Patient is to discontinue taking enteric coated aspirin 325mg.    Patient is to discontinue wearing [place] hose.    Continue with outpatient physical therapy.    Follow up 2 months.  Percocet 10mg

## 2025-08-01 DIAGNOSIS — Z96.652 S/P TOTAL KNEE ARTHROPLASTY, LEFT: ICD-10-CM

## 2025-08-01 RX ORDER — OXYCODONE AND ACETAMINOPHEN 10; 325 MG/1; MG/1
1 TABLET ORAL EVERY 8 HOURS PRN
Qty: 21 TABLET | Refills: 0 | Status: SHIPPED | OUTPATIENT
Start: 2025-08-01 | End: 2025-08-08

## 2025-08-01 NOTE — TELEPHONE ENCOUNTER
.Last appointment 7/24/2025  Next appointment   Future Appointments   Date Time Provider Department Center   9/25/2025  1:15 PM Bella Galicia PA-C [facility]      Last refill 07/24/2025  DOS: 06/11/2025      Patient called in requesting refill of :    oxyCODONE-acetaminophen (PERCOCET)  MG per tablet      [facility] #[id number] - [place], [place] - [address] - P [phone] - F [phone]

## 2025-08-07 DIAGNOSIS — Z96.652 S/P TOTAL KNEE ARTHROPLASTY, LEFT: ICD-10-CM

## 2025-08-07 RX ORDER — OXYCODONE AND ACETAMINOPHEN 10; 325 MG/1; MG/1
1 TABLET ORAL EVERY 8 HOURS PRN
Qty: 21 TABLET | Refills: 0 | Status: SHIPPED | OUTPATIENT
Start: 2025-08-07 | End: 2025-08-14

## 2025-08-14 DIAGNOSIS — Z96.652 S/P TOTAL KNEE ARTHROPLASTY, LEFT: ICD-10-CM

## 2025-08-14 RX ORDER — OXYCODONE AND ACETAMINOPHEN 10; 325 MG/1; MG/1
1 TABLET ORAL EVERY 8 HOURS PRN
Qty: 21 TABLET | Refills: 0 | Status: SHIPPED | OUTPATIENT
Start: 2025-08-14 | End: 2025-08-21

## 2025-08-22 ENCOUNTER — TELEPHONE (OUTPATIENT)
Dept: ORTHOPEDIC SURGERY | Age: 65
End: 2025-08-22

## 2025-08-22 DIAGNOSIS — Z96.652 S/P TOTAL KNEE ARTHROPLASTY, LEFT: Primary | ICD-10-CM

## 2025-08-22 RX ORDER — OXYCODONE AND ACETAMINOPHEN 10; 325 MG/1; MG/1
1 TABLET ORAL 2 TIMES DAILY PRN
Qty: 14 TABLET | Refills: 0 | Status: SHIPPED | OUTPATIENT
Start: 2025-08-22 | End: 2025-08-29

## 2025-08-28 DIAGNOSIS — Z96.652 S/P TOTAL KNEE ARTHROPLASTY, LEFT: ICD-10-CM

## 2025-08-29 RX ORDER — OXYCODONE AND ACETAMINOPHEN 10; 325 MG/1; MG/1
1 TABLET ORAL 2 TIMES DAILY PRN
Qty: 14 TABLET | Refills: 0 | Status: SHIPPED | OUTPATIENT
Start: 2025-08-29 | End: 2025-09-05

## 2025-09-05 DIAGNOSIS — Z96.652 S/P TOTAL KNEE ARTHROPLASTY, LEFT: ICD-10-CM

## 2025-09-05 RX ORDER — OXYCODONE AND ACETAMINOPHEN 10; 325 MG/1; MG/1
1 TABLET ORAL 2 TIMES DAILY PRN
Qty: 14 TABLET | Refills: 0 | Status: SHIPPED | OUTPATIENT
Start: 2025-09-05 | End: 2025-09-12

## (undated) DEVICE — HANDPIECE SET WITH BONE CLEANING TIP: Brand: INTERPULSE

## (undated) DEVICE — GLOVE ORTHO 8   MSG9480

## (undated) DEVICE — KIT POS LEG DISP

## (undated) DEVICE — CORD RETRCT SIL - ORDER MULTIPLES OF 10 EACH

## (undated) DEVICE — 4-PORT MANIFOLD: Brand: NEPTUNE 2

## (undated) DEVICE — GLOVE ORANGE PI 8   MSG9080

## (undated) DEVICE — SOLUTION WND IRRIGATION 450 ML 0.5 PVP-I 0.9 NACL

## (undated) DEVICE — SUTURE SUTTAPE L40IN DIA1.3MM NONABSORBABLE WHT BLU L26.5MM AR7500

## (undated) DEVICE — 3 BONE CEMENT MIXER: Brand: MIXEVAC

## (undated) DEVICE — APPLICATOR MEDICATED 26 CC SOLUTION HI LT ORNG CHLORAPREP

## (undated) DEVICE — DRESSING HYDROFIBER AQUACEL AG ADVANTAGE 3.5X12 IN

## (undated) DEVICE — TOTAL KNEE PACK: Brand: MEDLINE INDUSTRIES, INC.

## (undated) DEVICE — PIN BNE FIX TEMP L140MM DIA4MM MAKO

## (undated) DEVICE — SOL IRR SOD CHL 0.9% TITAN XL CNTNR 3000ML

## (undated) DEVICE — BLADE SURG SAW S STL NAR OSC W/ SERR EDGE DISP

## (undated) DEVICE — SOLUTION SCRB 4OZ 7.5% POVIDONE IOD ANTIMIC BTL

## (undated) DEVICE — SYRINGE MED 50ML LUERLOCK TIP

## (undated) DEVICE — WIPES SKIN CLOTH READYPREP 9 X 10.5 IN 2% CHLORHEX GLUCONATE CHG PREOP

## (undated) DEVICE — KIT TRK KNEE PROC VIZADISC

## (undated) DEVICE — BLADE,STAINLESS-STEEL,15,STRL,DISPOSABLE: Brand: MEDLINE

## (undated) DEVICE — GARMENT,MEDLINE,DVT,INT,CALF,MED, GEN2: Brand: MEDLINE

## (undated) DEVICE — BASIC DOUBLE BASIN 2-LF: Brand: MEDLINE INDUSTRIES, INC.

## (undated) DEVICE — KIT INT FIX FEM TIB CKPT MAKOPLASTY

## (undated) DEVICE — 450 ML BOTTLE OF 0.05% CHLORHEXIDINE GLUCONATE IN 99.95% STERILE WATER FOR IRRIGATION, USP AND APPLICATOR.: Brand: IRRISEPT ANTIMICROBIAL WOUND LAVAGE

## (undated) DEVICE — ELECTRODE PT RET AD L9FT HI MOIST COND ADH HYDRGEL CORDED

## (undated) DEVICE — KIT DRP FOR RIO ROBOTIC ARM ASST SYS

## (undated) DEVICE — NEEDLE SPNL L3.5IN PNK HUB S STL REG WALL FIT STYL W/ QNCKE

## (undated) DEVICE — SUTURE STRATAFIX SYMMETRIC SZ 1 L18IN ABSRB VLT CT1 L36CM SXPP1A404

## (undated) DEVICE — BLADE SURG SAW STD S STL OSC W/ SERR EDGE DISP